# Patient Record
Sex: FEMALE | Race: WHITE | NOT HISPANIC OR LATINO | Employment: OTHER | URBAN - METROPOLITAN AREA
[De-identification: names, ages, dates, MRNs, and addresses within clinical notes are randomized per-mention and may not be internally consistent; named-entity substitution may affect disease eponyms.]

---

## 2017-01-06 ENCOUNTER — HOSPITAL ENCOUNTER (INPATIENT)
Facility: CLINIC | Age: 82
LOS: 18 days | Discharge: HOME | DRG: 884 | End: 2017-01-24
Attending: PHYSICIAN ASSISTANT | Admitting: PSYCHIATRY & NEUROLOGY
Payer: MEDICARE

## 2017-01-06 DIAGNOSIS — F32.A DEPRESSION, UNSPECIFIED DEPRESSION TYPE: ICD-10-CM

## 2017-01-06 DIAGNOSIS — F41.9 ANXIETY: ICD-10-CM

## 2017-01-06 DIAGNOSIS — F02.818 LATE ONSET ALZHEIMER'S DISEASE WITH BEHAVIORAL DISTURBANCE (H): Primary | ICD-10-CM

## 2017-01-06 DIAGNOSIS — F03.91 DEMENTIA WITH BEHAVIORAL DISTURBANCE, UNSPECIFIED DEMENTIA TYPE: ICD-10-CM

## 2017-01-06 DIAGNOSIS — G30.1 LATE ONSET ALZHEIMER'S DISEASE WITH BEHAVIORAL DISTURBANCE (H): Primary | ICD-10-CM

## 2017-01-06 LAB
ALBUMIN SERPL-MCNC: 4 G/DL (ref 3.4–5)
ALBUMIN UR-MCNC: NEGATIVE MG/DL
ALP SERPL-CCNC: 75 U/L (ref 40–150)
ALT SERPL W P-5'-P-CCNC: 33 U/L (ref 0–50)
AMPHETAMINES UR QL SCN: NORMAL
ANION GAP SERPL CALCULATED.3IONS-SCNC: 10 MMOL/L (ref 3–14)
APPEARANCE UR: CLEAR
AST SERPL W P-5'-P-CCNC: 44 U/L (ref 0–45)
BARBITURATES UR QL: NORMAL
BASOPHILS # BLD AUTO: 0 10E9/L (ref 0–0.2)
BASOPHILS NFR BLD AUTO: 0.3 %
BENZODIAZ UR QL: NORMAL
BILIRUB SERPL-MCNC: 0.7 MG/DL (ref 0.2–1.3)
BILIRUB UR QL STRIP: NEGATIVE
BUN SERPL-MCNC: 11 MG/DL (ref 7–30)
CALCIUM SERPL-MCNC: 9.4 MG/DL (ref 8.5–10.1)
CANNABINOIDS UR QL SCN: NORMAL
CHLORIDE SERPL-SCNC: 102 MMOL/L (ref 94–109)
CO2 SERPL-SCNC: 24 MMOL/L (ref 20–32)
COCAINE UR QL: NORMAL
COLOR UR AUTO: YELLOW
CREAT SERPL-MCNC: 0.63 MG/DL (ref 0.52–1.04)
DIFFERENTIAL METHOD BLD: NORMAL
EOSINOPHIL # BLD AUTO: 0.1 10E9/L (ref 0–0.7)
EOSINOPHIL NFR BLD AUTO: 0.9 %
ERYTHROCYTE [DISTWIDTH] IN BLOOD BY AUTOMATED COUNT: 13.6 % (ref 10–15)
GFR SERPL CREATININE-BSD FRML MDRD: ABNORMAL ML/MIN/1.7M2
GLUCOSE SERPL-MCNC: 102 MG/DL (ref 70–99)
GLUCOSE UR STRIP-MCNC: NEGATIVE MG/DL
HCT VFR BLD AUTO: 39.2 % (ref 35–47)
HGB BLD-MCNC: 13.5 G/DL (ref 11.7–15.7)
HGB UR QL STRIP: NEGATIVE
IMM GRANULOCYTES # BLD: 0 10E9/L (ref 0–0.4)
IMM GRANULOCYTES NFR BLD: 0.3 %
KETONES UR STRIP-MCNC: NEGATIVE MG/DL
LEUKOCYTE ESTERASE UR QL STRIP: ABNORMAL
LYMPHOCYTES # BLD AUTO: 1.9 10E9/L (ref 0.8–5.3)
LYMPHOCYTES NFR BLD AUTO: 28.7 %
MCH RBC QN AUTO: 32.1 PG (ref 26.5–33)
MCHC RBC AUTO-ENTMCNC: 34.4 G/DL (ref 31.5–36.5)
MCV RBC AUTO: 93 FL (ref 78–100)
MONOCYTES # BLD AUTO: 0.8 10E9/L (ref 0–1.3)
MONOCYTES NFR BLD AUTO: 12.5 %
NEUTROPHILS # BLD AUTO: 3.8 10E9/L (ref 1.6–8.3)
NEUTROPHILS NFR BLD AUTO: 57.3 %
NITRATE UR QL: NEGATIVE
NRBC # BLD AUTO: 0 10*3/UL
NRBC BLD AUTO-RTO: 0 /100
OPIATES UR QL SCN: NORMAL
PCP UR QL SCN: NORMAL
PH UR STRIP: 6.5 PH (ref 5–7)
PLATELET # BLD AUTO: 232 10E9/L (ref 150–450)
POTASSIUM SERPL-SCNC: 3.7 MMOL/L (ref 3.4–5.3)
PROT SERPL-MCNC: 7.4 G/DL (ref 6.8–8.8)
RBC # BLD AUTO: 4.21 10E12/L (ref 3.8–5.2)
RBC #/AREA URNS AUTO: ABNORMAL /HPF (ref 0–2)
SODIUM SERPL-SCNC: 136 MMOL/L (ref 133–144)
SP GR UR STRIP: 1.01 (ref 1–1.03)
TSH SERPL DL<=0.005 MIU/L-ACNC: 2.77 MU/L (ref 0.4–4)
URN SPEC COLLECT METH UR: ABNORMAL
UROBILINOGEN UR STRIP-ACNC: 0.2 EU/DL (ref 0.2–1)
WBC # BLD AUTO: 6.7 10E9/L (ref 4–11)
WBC #/AREA URNS AUTO: ABNORMAL /HPF (ref 0–2)

## 2017-01-06 PROCEDURE — 87086 URINE CULTURE/COLONY COUNT: CPT | Performed by: PHYSICIAN ASSISTANT

## 2017-01-06 PROCEDURE — 25000132 ZZH RX MED GY IP 250 OP 250 PS 637: Mod: GY | Performed by: PHYSICIAN ASSISTANT

## 2017-01-06 PROCEDURE — 84443 ASSAY THYROID STIM HORMONE: CPT | Performed by: PHYSICIAN ASSISTANT

## 2017-01-06 PROCEDURE — A9270 NON-COVERED ITEM OR SERVICE: HCPCS | Mod: GY | Performed by: PHYSICIAN ASSISTANT

## 2017-01-06 PROCEDURE — 80307 DRUG TEST PRSMV CHEM ANLYZR: CPT | Performed by: PHYSICIAN ASSISTANT

## 2017-01-06 PROCEDURE — 80053 COMPREHEN METABOLIC PANEL: CPT | Performed by: PHYSICIAN ASSISTANT

## 2017-01-06 PROCEDURE — 90791 PSYCH DIAGNOSTIC EVALUATION: CPT

## 2017-01-06 PROCEDURE — 85025 COMPLETE CBC W/AUTO DIFF WBC: CPT | Performed by: PHYSICIAN ASSISTANT

## 2017-01-06 PROCEDURE — 81001 URINALYSIS AUTO W/SCOPE: CPT | Performed by: PHYSICIAN ASSISTANT

## 2017-01-06 PROCEDURE — 99285 EMERGENCY DEPT VISIT HI MDM: CPT | Mod: 25

## 2017-01-06 PROCEDURE — 12400006 ZZH R&B MH INTERMEDIATE

## 2017-01-06 RX ORDER — QUETIAPINE FUMARATE 50 MG/1
50-150 TABLET, FILM COATED ORAL
Status: DISCONTINUED | OUTPATIENT
Start: 2017-01-06 | End: 2017-01-07

## 2017-01-06 RX ORDER — ACETAMINOPHEN 500 MG
500-1000 TABLET ORAL DAILY PRN
Status: DISCONTINUED | OUTPATIENT
Start: 2017-01-06 | End: 2017-01-24 | Stop reason: HOSPADM

## 2017-01-06 RX ORDER — LORAZEPAM 0.5 MG/1
0.5 TABLET ORAL ONCE
Status: COMPLETED | OUTPATIENT
Start: 2017-01-06 | End: 2017-01-06

## 2017-01-06 RX ORDER — ASPIRIN 81 MG/1
81 TABLET, CHEWABLE ORAL DAILY
Status: DISCONTINUED | OUTPATIENT
Start: 2017-01-07 | End: 2017-01-24 | Stop reason: HOSPADM

## 2017-01-06 RX ORDER — SIMVASTATIN 10 MG
40 TABLET ORAL AT BEDTIME
Status: DISCONTINUED | OUTPATIENT
Start: 2017-01-07 | End: 2017-01-11

## 2017-01-06 RX ORDER — LANOLIN ALCOHOL/MO/W.PET/CERES
1000 CREAM (GRAM) TOPICAL DAILY
Status: DISCONTINUED | OUTPATIENT
Start: 2017-01-07 | End: 2017-01-24 | Stop reason: HOSPADM

## 2017-01-06 RX ADMIN — LORAZEPAM 0.5 MG: 0.5 TABLET ORAL at 20:21

## 2017-01-06 ASSESSMENT — ENCOUNTER SYMPTOMS
SHORTNESS OF BREATH: 0
NERVOUS/ANXIOUS: 1
FEVER: 0
SLEEP DISTURBANCE: 1
CONFUSION: 1

## 2017-01-06 NOTE — IP AVS SNAPSHOT
[place] [address] [place] [address]    Phone:  [phone]                                       After Visit Summary   1/6/2017    Vanessa Watson    MRN: [id number]           After Visit Summary Signature Page     I have received my discharge instructions, and my questions have been answered. I have discussed any challenges I see with this plan with the nurse or doctor.    ..........................................................................................................................................  Patient/Patient Representative Signature      ..........................................................................................................................................  Patient Representative Print Name and Relationship to Patient    ..................................................               ................................................  Date                                            Time    ..........................................................................................................................................  Reviewed by Signature/Title    ...................................................              ..............................................  Date                                                            Time

## 2017-01-06 NOTE — IP AVS SNAPSHOT
MRN:[id number]                      After Visit Summary   1/6/2017    Vanessa Watson    MRN: [id number]           Thank you!     Thank you for choosing [facility] for your care. Our goal is always to provide you with excellent care.        Patient Information     Date Of Birth          6/14/1931        About your hospital stay     You were admitted on:  January 6, 2017 You last received care in the:  [facility]    You were discharged on:  January 24, 2017       Who to Call     For medical emergencies, please call 911.  For non-urgent questions about your medical care, please call your primary care provider or clinic, [phone]          Attending Provider     Provider    Katie Brand PA-C Winegarden, Thomas C, MD       Primary Care Provider Office Phone # Fax #    Joel Eaton [phone]       [place] [address]        Further instructions from your care team       Behavioral Discharge Planning and Instructions    Summary:  Admitted due to anxiety and poor sleep    Main Diagnosis:   Dementia with behavioral disturbance; Rule out Bipolar Disorder, type 2, most recent episode hypomanic.     Major Treatments, Procedures and Findings:  Psychiatric assessment    Symptoms to Report: Losing more sleep, Mood getting worse or Thoughts of suicide    Lifestyle Adjustment: Follow all treatment recommendations. Develop and follow safety plan. Due to your dementia diagnosis,  you should no longer drive a vehicle.  Your son will take possession of your car keys so that you can no longer drive.     Psychiatry Follow-up:     Your son will set up a follow up an appointment for you with a psychiatrist closer to the assisted living where you will be residing. In the meantime, until you have established care with a new psychiatrist, please contact your primary care physician for refills of your medications.  "    [facility]  Dr. Joel Eaton MD  [address] [address]  [phone] / [phone] fax    Your current [facility]  is Gisela Weiner at [facility]. She can be reached at [phone]. Her fax number is [phone].     Your son is making arrangements for you to move to [facility].     [facility]  [address] [place] [address] [phone] / [phone] fax    Resources:   Crisis Intervention: 866.719.1744 or 378-536-3181 (TTY: 972.948.2925).  Call anytime for help.  National [place] on [place] Illness (www.[place].chato.org): 757.854.1955 or 058-065-0644.  National Suicide Prevention Line (www.mentalhealthmn.org): 232-061-LEOJ (5590)  Mental Health Association of [place] (www.mentalhealth.org): 495.403.6306 or 460-924-9183    General Medication Instructions:   See your medication sheet(s) for instructions.   Take all medicines as directed.  Make no changes unless your doctor suggests them.   Go to all your doctor visits.  Be sure to have all your required lab tests. This way, your medicines can be refilled on time.  Do not use any drugs not prescribed by your doctor.  Avoid alcohol.        Pending Results     No orders found from 1/5/2017 to 1/7/2017.            Statement of Approval     Ordered          01/24/17 1021  I have reviewed and agree with all the recommendations and orders detailed in this document.   EFFECTIVE NOW     Approved and electronically signed by:  Stefan Cartagena MD             Admission Information        Provider Department Dept Phone    1/6/2017 Stefan Cartagena MD [facility] [phone]      Your Vitals Were     Blood Pressure Pulse Temperature    129/66 mmHg 75 97.5  F (36.4  C) (Oral)    Respirations Height Weight    16 1.549 m (5' 1\") 61.372 kg (135 lb 4.8 oz)    BMI (Body Mass Index) Pulse Oximetry       25.58 kg/m2 94%       MyChart Information     MyChart lets you " "send messages to your doctor, view your test results, renew your prescriptions, schedule appointments and more. To sign up, go to [email or web address] . Click on \"Log in\" on the left side of the screen, which will take you to the Welcome page. Then click on \"Sign up Now\" on the right side of the page.     You will be asked to enter the access code listed below, as well as some personal information. Please follow the directions to create your username and password.     Your access code is: [email or web address]  Expires: 2017 11:32 AM     Your access code will  in 90 days. If you need help or a new code, please call your [facility] clinic or [phone].        Care EveryWhere ID     This is your Care EveryWhere ID. This could be used by other organizations to access your [facility] medical records  [id number]           Review of your medicines      START taking        Dose / Directions    * OLANZapine 2.5 MG tablet   Commonly known as:  zyPREXA   Used for:  Dementia with behavioral disturbance, unspecified dementia type        Dose:  2.5 mg   Take 1 tablet (2.5 mg) by mouth every morning   Quantity:  30 tablet   Refills:  0       * OLANZapine 5 MG tablet   Commonly known as:  zyPREXA   Used for:  Late onset Alzheimer's disease with behavioral disturbance, Dementia with behavioral disturbance, unspecified dementia type        Dose:  5 mg   Take 1 tablet (5 mg) by mouth At Bedtime   Quantity:  30 tablet   Refills:  0       * Notice:  This list has 2 medication(s) that are the same as other medications prescribed for you. Read the directions carefully, and ask your doctor or other care provider to review them with you.      CONTINUE these medicines which have NOT CHANGED        Dose / Directions    acetaminophen 500 MG tablet   Commonly known as:  TYLENOL        Dose:  500-1000 mg   Take 500-1,000 mg by mouth daily as needed for mild pain   Refills:  0       Alendronate Sodium 70 MG Tbef        Dose:  70 mg   Take " 70 mg by mouth every 7 days   Refills:  0       ASPIRIN PO        Dose:  81 mg   Take 81 mg by mouth daily   Refills:  0       calcium-vitamin D 600-400 MG-UNIT per tablet   Commonly known as:  CALTRATE        Dose:  1 tablet   Take 1 tablet by mouth daily   Refills:  0       cholecalciferol 2000 UNITS Caps        Dose:  2000 Units   Take 2,000 Units by mouth daily   Refills:  0       cyanocobalamin 1000 MCG Tbcr   Commonly known as:  VITAMIN B-12 ER        Dose:  1000 mcg   Take 1,000 mcg by mouth daily   Refills:  0       SIMVASTATIN PO        Dose:  40 mg   Take 40 mg by mouth At Bedtime   Refills:  0         STOP taking     SEROQUEL PO                Where to get your medicines      These medications were sent to [facility] - [address]  [address], [place] [address]     Phone:  [phone]    - OLANZapine 2.5 MG tablet  - OLANZapine 5 MG tablet             Protect others around you: Learn how to safely use, store and throw away your medicines at www.disposemymeds.org.             Medication List: This is a list of all your medications and when to take them. Check marks below indicate your daily home schedule. Keep this list as a reference.      Medications           Morning Afternoon Evening Bedtime As Needed    acetaminophen 500 MG tablet   Commonly known as:  TYLENOL   Take 500-1,000 mg by mouth daily as needed for mild pain                                Alendronate Sodium 70 MG Tbef   Take 70 mg by mouth every 7 days                                ASPIRIN PO   Take 81 mg by mouth daily   Last time this was given:  81 mg on 1/24/2017  8:35 AM                                calcium-vitamin D 600-400 MG-UNIT per tablet   Commonly known as:  CALTRATE   Take 1 tablet by mouth daily   Last time this was given:  1 tablet on 1/24/2017  8:34 AM                                cholecalciferol 2000 UNITS Caps   Take 2,000 Units by mouth daily                                 cyanocobalamin 1000 MCG Tbcr   Commonly known as:  VITAMIN B-12 ER   Take 1,000 mcg by mouth daily                                * OLANZapine 2.5 MG tablet   Commonly known as:  zyPREXA   Take 1 tablet (2.5 mg) by mouth every morning   Last time this was given:  2.5 mg on 1/24/2017  8:34 AM                                * OLANZapine 5 MG tablet   Commonly known as:  zyPREXA   Take 1 tablet (5 mg) by mouth At Bedtime   Last time this was given:  2.5 mg on 1/24/2017  8:34 AM                                SIMVASTATIN PO   Take 40 mg by mouth At Bedtime   Last time this was given:  40 mg on 1/23/2017  9:59 PM                                * Notice:  This list has 2 medication(s) that are the same as other medications prescribed for you. Read the directions carefully, and ask your doctor or other care provider to review them with you.              More Information        Understanding Dementia  Dementia is the name for a group of brain conditions that make it harder to remember, reason, and communicate. The most common form of dementia is Alzheimer disease. Other types include vascular dementia, frontotemporal dementia, and Lewy body dementia. Years ago, dementia was often called  senility.  It was even thought to be a normal part of aging. We now know that it s not normal. It s caused by ongoing damage to cells in the brain.    Symptoms of dementia  Symptoms differ depending on which parts of the brain are affected and the stage of the disease. The most common symptoms include:    Memory loss, including trouble with directions and familiar tasks    Language problems, such as trouble getting words out or understanding what is said    Difficulty with planning, organizing, concentration, and judgment. This includes people not being able to recognize their own symptoms.    Changes in behavior and personality  How dementia affects the brain  The brain controls all the workings of the mind and body. Some parts of the  brain control memory and language. Other parts control movement and coordination. With dementia, nerve cells in the brain are gradually damaged or destroyed. Why this happens is not yet clear. But over time, parts of the brain begin to atrophy (shrink). Brain atrophy often starts in the part of the brain that controls memory, reasoning, and personality. Other parts of the brain may not be affected until much later in the illness.  The stages of dementia  Dementia is a progressive disease. This means it gets worse over time. Symptoms differ for each person, but there are 3 basic stages. Each may last from months to years:    In the early stage, a person may seem forgetful, confused, or have changes in behavior. However, he or she may still be able to handle most tasks without help.    In the middle stage, more and more help is needed with daily tasks. A person may have trouble recognizing friends and family members, wander, or get lost in familiar places. He or she may also become restless or tapia.    In the late stage, Alzheimer s can cause severe problems with memory, judgment, and other skills. Help is needed with nearly every aspect of daily life.  Treating dementia  At present, there s no cure for dementia. But with proper care, many people can live comfortably for years:     Medicines are a key part of treatment. Some types can help slow the progression of symptoms, such as memory loss. Others can help ease mood, behavior, and sleep problems. These medicines work for some people but not all.    Activity and exercise are good for body and mind. They may even help slow the progression of the disease. Simple, repetitive activities are good choices.    Regular healthcare provider visits help keep track of symptoms and overall health.    Sleep-wake cycle can be mixed up in patients with dementia. They may function better being up at nighttime and sleeping during the daytime.      Social interactions are important  to maintain.      9869-3901 [employer]. [address] [place] [address], [place] [address]. All rights reserved. This information is not intended as a substitute for professional medical care. Always follow your healthcare professional's instructions.

## 2017-01-06 NOTE — IP AVS SNAPSHOT
MRN:[id number]                      After Visit Summary   1/6/2017    Vanessa [place]    MRN: [id number]           Visit Information        Department      1/6/2017  6:49 PM [facility]          Review of your medicines      START taking        Dose / Directions    * OLANZapine 2.5 MG tablet   Commonly known as:  zyPREXA   Used for:  Dementia with behavioral disturbance, unspecified dementia type        Dose:  2.5 mg   Take 1 tablet (2.5 mg) by mouth every morning   Quantity:  30 tablet   Refills:  0       * OLANZapine 5 MG tablet   Commonly known as:  zyPREXA   Used for:  Late onset Alzheimer's disease with behavioral disturbance, Dementia with behavioral disturbance, unspecified dementia type        Dose:  5 mg   Take 1 tablet (5 mg) by mouth At Bedtime   Quantity:  30 tablet   Refills:  0       * Notice:  This list has 2 medication(s) that are the same as other medications prescribed for you. Read the directions carefully, and ask your doctor or other care provider to review them with you.      CONTINUE these medicines which have NOT CHANGED        Dose / Directions    acetaminophen 500 MG tablet   Commonly known as:  TYLENOL        Dose:  500-1000 mg   Take 500-1,000 mg by mouth daily as needed for mild pain   Refills:  0       Alendronate Sodium 70 MG Tbef        Dose:  70 mg   Take 70 mg by mouth every 7 days   Refills:  0       ASPIRIN PO        Dose:  81 mg   Take 81 mg by mouth daily   Refills:  0       calcium-vitamin D 600-400 MG-UNIT per tablet   Commonly known as:  CALTRATE        Dose:  1 tablet   Take 1 tablet by mouth daily   Refills:  0       cholecalciferol 2000 UNITS Caps        Dose:  2000 Units   Take 2,000 Units by mouth daily   Refills:  0       cyanocobalamin 1000 MCG Tbcr   Commonly known as:  VITAMIN B-12 ER        Dose:  1000 mcg   Take 1,000 mcg by mouth daily   Refills:  0       SIMVASTATIN PO        Dose:  40 mg   Take 40 mg by mouth At Bedtime    Refills:  0         STOP taking     SEROQUEL PO                Where to get your medicines      These medications were sent to [facility] [place], [place] - [address]  [address] [place] [address] [address]     Phone:  [phone]    - OLANZapine 2.5 MG tablet  - OLANZapine 5 MG tablet            Prescriptions were sent or printed at these locations (2 Prescriptions)                   [facility] - [address]   [address], [place] [address]    Telephone:  [phone]   Fax:  [phone]   Hours:                  E-Prescribed (2 of 2)         OLANZapine (ZYPREXA) 2.5 MG tablet               OLANZapine (ZYPREXA) 5 MG tablet                 Protect others around you: Learn how to safely use, store and throw away your medicines at www.disposemymeds.org.         Follow-ups after your visit         Care Instructions        Further instructions from your care team       Behavioral Discharge Planning and Instructions    Summary:  Admitted due to anxiety and poor sleep    Main Diagnosis:   Dementia with behavioral disturbance; Rule out Bipolar Disorder, type 2, most recent episode hypomanic.     Major Treatments, Procedures and Findings:  Psychiatric assessment    Symptoms to Report: Losing more sleep, Mood getting worse or Thoughts of suicide    Lifestyle Adjustment: Follow all treatment recommendations. Develop and follow safety plan. Due to your dementia diagnosis,  you should no longer drive a vehicle.  Your son will take possession of your car keys so that you can no longer drive.     Psychiatry Follow-up:     Your son will set up a follow up an appointment for you with a psychiatrist closer to the assisted living where you will be residing. In the meantime, until you have established care with a new psychiatrist, please contact your primary care physician for refills of your medications.     [facility]  Dr. Maldonado  "MD [place]  [address] [address]  [phone] / [phone] fax    Your current [facility]  is Gisela Weiner at [facility]. She can be reached at [phone]. Her fax number is [phone].     Your son is making arrangements for you to move to [facility].     [address] [address] [phone] / [phone] fax    Resources:   Crisis Intervention: 196.308.8775 or 376-139-4757 (TTY: 886.641.2016).  Call anytime for help.  National [place] on Mental Illness (www.mn.chato.org): 839.681.3747 or 438-437-4837.  National Suicide Prevention Line (www.mentalhealthmn.org): 113-038-LGUV (1826)  Mental Health Association of [place] (www.mentalhealth.org): 117.384.6823 or 354-364-6440    General Medication Instructions:   See your medication sheet(s) for instructions.   Take all medicines as directed.  Make no changes unless your doctor suggests them.   Go to all your doctor visits.  Be sure to have all your required lab tests. This way, your medicines can be refilled on time.  Do not use any drugs not prescribed by your doctor.  Avoid alcohol.        Statement of Approval     Ordered          01/24/17 1021  I have reviewed and agree with all the recommendations and orders detailed in this document.   EFFECTIVE NOW     Approved and electronically signed by:  Stefan Cartagena MD              Additional Information About Your Visit        [facility] Information     [facility] lets you send messages to your doctor, view your test results, renew your prescriptions, schedule appointments and more. To sign up, go to [email or web address] . Click on \"Log in\" on the left side of the screen, which will take you to the Welcome page. Then click on \"Sign up Now\" on the right side of the page.     You will be asked to enter the access code listed below, as well as some personal information. Please follow the directions to create your " "username and password.     Your access code is: [email or web address]  Expires: 2017 11:32 AM     Your access code will  in 90 days. If you need help or a new code, please call your [facility] clinic or [phone].        Care EveryWhere ID     This is your Care EveryWhere ID. This could be used by other organizations to access your [facility] medical records  [id number]        Your Vitals Were     Blood Pressure Pulse Temperature    129/66 mmHg 75 97.5  F (36.4  C) (Oral)    Respirations Height Weight    16 1.549 m (5' 1\") 61.372 kg (135 lb 4.8 oz)    BMI (Body Mass Index) Pulse Oximetry       25.58 kg/m2 94%        Primary Care Provider Office Phone # Fax #    Joel Eaton [phone]      Thank you!     Thank you for choosing [facility] for your care. Our goal is always to provide you with excellent care.             Medication List: This is a list of all your medications and when to take them. Check marks below indicate your daily home schedule. Keep this list as a reference.      Medications           Morning Afternoon Evening Bedtime As Needed    acetaminophen 500 MG tablet   Commonly known as:  TYLENOL   Take 500-1,000 mg by mouth daily as needed for mild pain                                Alendronate Sodium 70 MG Tbef   Take 70 mg by mouth every 7 days                                ASPIRIN PO   Take 81 mg by mouth daily   Last time this was given:  81 mg on 2017  8:35 AM                                calcium-vitamin D 600-400 MG-UNIT per tablet   Commonly known as:  CALTRATE   Take 1 tablet by mouth daily   Last time this was given:  1 tablet on 2017  8:34 AM                                cholecalciferol 2000 UNITS Caps   Take 2,000 Units by mouth daily                                cyanocobalamin 1000 MCG Tbcr   Commonly known as:  VITAMIN B-12 ER   Take 1,000 mcg by mouth daily                                * OLANZapine 2.5 MG tablet   Commonly known as:  zyPREXA   Take " 1 tablet (2.5 mg) by mouth every morning   Last time this was given:  2.5 mg on 1/24/2017  8:34 AM                                * OLANZapine 5 MG tablet   Commonly known as:  zyPREXA   Take 1 tablet (5 mg) by mouth At Bedtime   Last time this was given:  2.5 mg on 1/24/2017  8:34 AM                                SIMVASTATIN PO   Take 40 mg by mouth At Bedtime   Last time this was given:  40 mg on 1/23/2017  9:59 PM                                * Notice:  This list has 2 medication(s) that are the same as other medications prescribed for you. Read the directions carefully, and ask your doctor or other care provider to review them with you.

## 2017-01-07 LAB
BACTERIA SPEC CULT: NORMAL
Lab: NORMAL
MICRO REPORT STATUS: NORMAL
SPECIMEN SOURCE: NORMAL

## 2017-01-07 PROCEDURE — 25000132 ZZH RX MED GY IP 250 OP 250 PS 637: Mod: GY | Performed by: PSYCHIATRY & NEUROLOGY

## 2017-01-07 PROCEDURE — A9270 NON-COVERED ITEM OR SERVICE: HCPCS | Mod: GY | Performed by: PSYCHIATRY & NEUROLOGY

## 2017-01-07 PROCEDURE — 12400000 ZZH R&B MH

## 2017-01-07 PROCEDURE — 99221 1ST HOSP IP/OBS SF/LOW 40: CPT | Performed by: INTERNAL MEDICINE

## 2017-01-07 RX ORDER — OLANZAPINE 2.5 MG/1
2.5 TABLET, FILM COATED ORAL EVERY 6 HOURS PRN
Status: DISCONTINUED | OUTPATIENT
Start: 2017-01-07 | End: 2017-01-21

## 2017-01-07 RX ORDER — OLANZAPINE 2.5 MG/1
2.5 TABLET, FILM COATED ORAL AT BEDTIME
Status: DISCONTINUED | OUTPATIENT
Start: 2017-01-07 | End: 2017-01-09

## 2017-01-07 RX ADMIN — CYANOCOBALAMIN TAB 1000 MCG 1000 MCG: 1000 TAB at 07:49

## 2017-01-07 RX ADMIN — ASPIRIN 81 MG 81 MG: 81 TABLET ORAL at 07:49

## 2017-01-07 RX ADMIN — OLANZAPINE 2.5 MG: 2.5 TABLET, FILM COATED ORAL at 20:13

## 2017-01-07 RX ADMIN — Medication 1 TABLET: at 07:49

## 2017-01-07 RX ADMIN — OLANZAPINE 2.5 MG: 2.5 TABLET, FILM COATED ORAL at 21:50

## 2017-01-07 RX ADMIN — SIMVASTATIN 40 MG: 10 TABLET, FILM COATED ORAL at 20:14

## 2017-01-07 RX ADMIN — VITAMIN D, TAB 1000IU (100/BT) 2000 UNITS: 25 TAB at 07:49

## 2017-01-07 NOTE — ED NOTES
"[facility]  ED Nurse Handoff Report    ED Chief complaint: Anxiety      ED Diagnosis:   Final diagnoses:   Anxiety   Depression, unspecified depression type       Code Status: Full Code    Allergies: No Known Allergies    Activity level:  Independent     Needed?: No    Isolation: No  Infection: Not Applicable    Bariatric?: No      Vital Signs:   Filed Vitals:    01/06/17 1846   BP: 171/84   Pulse: 83   Temp: 98.3  F (36.8  C)   TempSrc: Oral   Resp: 14   Height: 1.575 m (5' 2\")   Weight: 60.782 kg (134 lb)   SpO2: 96%       Cardiac Rhythm: ,     n/a      Pain level:  zero    Is this patient confused?: No    Patient Report: Initial Complaint: anxiety  Focused Assessment: alert, anxious, cooperative, pt wanted to be admitted  Tests Performed: labs, urine  Abnormal Results: urine--wbc present, see reports  Treatments provided: ativan 0.5 mg given    Family Comments: not present    OBS brochure/video discussed/provided to patient: N/A    ED Medications:   Medications   LORazepam (ATIVAN) tablet 0.5 mg (0.5 mg Oral Given 1/6/17 2021)       Drips infusing?:  No      ED NURSE PHONE NUMBER: [phone]             "
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
## 2017-01-07 NOTE — H&P
"IDENTIFYING DATA:  Vanessa Watson is an 85-year-old   female admitted voluntarily due to complaints of anxiety and poor sleep.      CHIEF COMPLAINT:  \"I talk too much and I can't sleep.\"       HISTORY OF PRESENT ILLNESS:  Vanessa is an 85-year-old   female who resides locally.  She came into the emergency room expressing that she had been uncontrollably anxious, unable to function and unable to sleep.  She is very long winded and circumstantial.  Her speech is pressured and she is a bit difficult to redirect.  She indicates a number of stressors, says that she recently lost her job as a  and also lost her cat in her apartment building.  She has been taking some Seroquel at bedtime, previously was on Remeron and in the spring of 2016 was hospitalized at the geriatric unit at [facility].  She sees Dr. Solis who treats her for anxiety.  She showed me a list of medications that he has tried with her including Lamictal, Seroquel, Cymbalta and Remeron.  She tells me that he \"mentioned bipolar.\"  She has had sleep complaints and when he increased the Seroquel, she says it made her too sedated and she could not tolerate it.  She goes on and on in a very long winded fashion and needs a lot of redirection.  She is fully oriented and clinically has a very hypomanic flavor.      On further questioning,  the patient denies thoughts of wanting to hurt self or others, but told the ER that she could not go home, she could not function or tolerate her anxiety.  She does live independently.  They admitted her voluntarily, gave her a small dose of Ativan.  She does not have any chemical dependency history.  She is not endorsing any psychosis, panic disorder, obsessive-compulsive disorder, trauma history or symptoms of PTSD.  She seems to appreciate the fact that she cannot stop talking and states \"I hope you can help me with that.\"      PAST " PSYCHIATRIC HISTORY:  As above.  She was treated with Remeron and Seroquel over at [facility] and sees her outpatient psychiatrist.  It looks like they diagnosed her with depression and anxiety, contemplated some cognitive issues, but there is really nothing definitive there based on past testing.  She is fully oriented.      PAST CHEMICAL DEPENDENCY HISTORY:  Negative.      PAST MEDICAL HISTORY:  Unremarkable.      CURRENT MEDICATIONS:  Include vitamin D, aspirin and Seroquel 25-50 mg q.h.s. along with Zocor 40 mg at bedtime.      FAMILY HISTORY:  She says there is addiction in her family, including her daughter as well as depression.      SOCIAL HISTORY:  I believe the patient is .  Her  used to be a dentist and she mentions that he was very chemically dependent.  She goes on and on about her children, specifically talking about her daughter who has an addiction problem and is also a dentist.  She lives locally in an apartment, had been a  and prior to this was a .  She has [employer] degree.      REVIEW OF SYSTEMS:  A 10-point review of systems is conducted and the only positive symptom is poor sleep and anxiety on neurologic review of systems.  All other systems negative.        VITAL SIGNS:  Most recent vital signs:  Temperature 97.7, pulse 68, respiratory rate 14, blood pressure 126/75, oxygen saturation 94%.      MENTAL STATUS EXAMINATION:  Appearance:  The patient is a pleasant woman dressed in hospital garb.  She appears much much younger than her stated age of 85.  She is a fair historian.  Speech is pressured, use of language appropriate.  Motor exam, fidgety.  Affect is anxious.  Mood is anxious.  Thought process is very circumstantial with a mild to moderate flight of ideas.  She does respond to redirection.  Thought content negative for current hallucinations, delusions, paranoia, suicidal or  homicidal ideation.  She perseverates on her anxiety, poor sleep and difficulty slowing down her speech.  Insight and judgment are fair.  Cognitive exam, the patient is alert and oriented x3.  Concentration fair.  Recent memory fair.  Remote memory intact.  General fund of knowledge average.      IMPRESSION:  The patient is an 85-year-old woman presenting hyperverbal, anxious, sleep deprived.  Clinically, she looks hypomanic.  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  She has tried a few antidepressants and responds negatively to Seroquel.  Depakote might be worth considering, but I will offer her some Zyprexa to start with in a low dose.      DIAGNOSES:   1.  Anxiety disorder, not otherwise specified.   2.  Rule out bipolar disorder, type 2, most recent episode hypomanic.      PLAN:   1.  Initiate Zyprexa 2.5 mg q.h.s., 2.5 mg q.6 hours p.r.n.  We will hold off on Seroquel.   2.  Consider low-dose Depakote if she continues to be this intrusive and hyperverbal.   3.  Dr. Cartagena will assume care Monday.         ADEBAYO VALENTINE MD             D: 2017 09:22   T: 2017 10:43   MT: [place]      Name:     CATINA CROWELL   MRN:      [id number]        Account:      [id number]   :      1931           Admitted:     [id number]      Document: [id number]

## 2017-01-07 NOTE — PROGRESS NOTES
Milana pack  Purse  Make up items  $94.00 cash ($34 on unit, $60 in safe)  Loose change  Misc. Cards  Coat  Feminine products  4 books  Keys x7  Tote bags x2  Black pants  Tank top  Flannel pants  Bra  Underwear x2  Sweater   Long sleeves x2  Black slip on shoes  Socks  Gloves  Make up bags x2 with various make up items   Lotion  Headphone x2  Water bottle  MN DL    Security envelope NO: [id number]  $60.00 cash   SS card   [employer] gift card  Visa 1409  EBT card   Visa [phone]  Visa [phone]  Checkbook [id number]        Admission:    Name:_____________________________________Date:_______________________    Discharge:    Name:_____________________________________Date:_______________________

## 2017-01-07 NOTE — ED NOTES
Report called to station 77, nurse request that pharmacy finish her med list then I can send her up

## 2017-01-07 NOTE — ED NOTES
Pt states she is feeling much more relaxed and is sleepy, lights dimmed and pt is going to try to sleep.

## 2017-01-07 NOTE — H&P
Pt seen for initial psychiatric evaluation, please see my dictation for details and recommendations.    Alexys Rodriguez MD

## 2017-01-07 NOTE — ED PROVIDER NOTES
"  History     Chief Complaint:  Anxiety    HPI   Vanessa Watson is a 85 year old female with a history of anxiety and depression who presents to ED for evaluation of worsening anxiety. She states that earlier this week she had to take her cats to the vet and one of them \"escaped\" into the florez of her building. Since then she has had \"uncontrollable anxiety\", causing her not to be able to sleep or function on a daily basis. She states that she is not suicidal though does not feel safe at home. She states that she wants to be admitted until her regular psychiatrist returns from vacation. She lives alone, but has a son who checks on her from time to time. She denies any physical symptoms, including no recent fevers, chills, nausea, vomiting, abdominal pain or urinary symptoms. To note, she had her Seroquel dosage increased from 25 mg to 50 mg two days ago. She is also on Remeron.     Allergies:  No Known Drug Allergies    Medications:    mirtazapine (REMERON) 15 MG tablet   simvastatin (ZOCOR) 40 MG tablet   acetaminophen (TYLENOL) 500 MG tablet   calcium-vitamin D (CALTRATE) 600-400 MG-UNIT per tablet   cholecalciferol 2000 UNITS CAPS   cyanocobalamin (VITAMIN B-12 ER) 1000 MCG TBCR   Seroquel 50 mg daily   Alendronate sodium     Past Medical History:    Osteopenia  HDL  Anxiety  Spastic colon     Past Surgical History:    Hip surgery  Orthopedic surgery      Family History:    History reviewed. No pertinent family history.       Social History:  The patient presented to the ED alone.   Smoking Status: Never smoker  Smokeless Tobacco: No  Alcohol Use: Yes   Marital Status:  Single [1]     Review of Systems   Constitutional: Negative for fever.   Respiratory: Negative for shortness of breath.    Cardiovascular: Negative for chest pain.   Psychiatric/Behavioral: Positive for confusion and sleep disturbance. Negative for suicidal ideas. The patient is nervous/anxious.    All other systems reviewed and are " "negative.    Physical Exam     Patient Vitals for the past 24 hrs:   BP Temp Temp src Pulse Heart Rate Resp SpO2 Height Weight   01/06/17 2154 123/62 mmHg 97.7  F (36.5  C) Oral 62 - 12 94 % - -   01/06/17 2152 - - - - - - 95 % - -   01/06/17 1846 171/84 mmHg 98.3  F (36.8  C) Oral 83 83 14 96 % 1.575 m (5' 2\") 60.782 kg (134 lb)      Physical Exam  Nursing note and vitals reviewed.     GENERAL: Alert, mild distress, non toxic appearing.   HEENT: Normal conjunctiva. No scleral icterus. MMM.   NECK: Supple.  CARDIAC: Normal rate and regular rhythm. Normal heart sounds. No murmurs, rubs, or gallops appreciated.  PULMONARY: CTA bilaterally. Normal breath sounds. No wheezing, crackles, or rhonchi appreciated.  ABDOMEN: Soft, non distended abdomen. Non-tender. No rebound or guarding.   NEURO: Alert and oriented. Non-focal. GCS 15. Sensation intact throughout. Normal speech. CN II-XII intact. Normal strength. Able to ambulate without difficulty.   MUSCULOSKELETAL: Normal range of motion. No peripheral edema.   SKIN: Skin is warm and dry. No rashes. No pallor or jaundice.   PSYCH: Very anxious appearing. Poor eye contact. Hyperverbal, rapid speech. Does not express suicidal or homicidal ideation.    Emergency Department Course     Laboratory:  Laboratory findings were communicated with the patient who voiced understanding of the findings.  CBC: AWNL. (WBC 6.7, HGB 13.5, )   CMP: Glucose: 102 (H) o/w WNL (Creatinine 0.63)  TSH With Free T4: TSH: 2.77  UA: Leukocyte Esterase: Small (A), WBC/HPF: 2-5 (A) o/w normal  UC: In process   Drug Abuse: Negative    Interventions:  2021 Ativan 0.5 mg PO      Emergency Department Course:  Nursing notes and vitals reviewed.  I performed an exam of the patient as documented above.   IV was inserted and blood was drawn for laboratory testing, results above.  The patient provided a urine sample here in the emergency department. This was sent for laboratory testing, findings above. "   The patient spoke to DEC while in the ED.   Patient felt much improved following above interventions.   I discussed the treatment plan with the patient. They expressed understanding of this plan and consented to admission. I discussed the patient with Dr. Rodriguez, who will admit the patient to a monitored bed for further evaluation and treatment, though Dr. Cartagena will be accepting.     I personally reviewed the laboratory results with the Patient and answered all related questions prior to admission.    Impression & Plan      Medical Decision Making:  Vanessa Watson is a 85 year old female with a history of anxiety and depression, currently on Seroquel and Remeron who presents to the emergency department today with worsening anxiety over the past week. She states that she can not function at home, is not sleeping or eating, and is quite concerned as her psychiatrist is currently out of town and she does not feel safe at home though she denies any suicidal ideation. Here, on my exam she is quite anxious appearing, hyperverbal. Other differentials considered for her underlying anxiety, however lab work up is unremarkable including no evidence of thyroid dysfunction, acute metabolic derangement or anemia. Urine shows small leukocyte esterase but no significant bacteria or other gross signs of infection and she is not having any active urinary symptoms. Urine culture pending. Urine drug screen negative and there is no clinical history or signs on exam suggesting other acute ingestion. No history of head trauma and she has a non focal exam, making any acute intracranial process unlikely. Given this, no indication for head imaging.     She was admitted here on 3/4 secondary to depression/anxiety with concurrent delirium which was felt to be related to a urinary tract infection at that time. She was see by Dr. Cartagena and initiated on Seroquel and Remeron which she has been taking. Her psychiatrist recently  increased her Seroquel a few days ago. She was also admitted to [facility] on 4/19 for decompensating anxiety. Based on review of her chart and after meeting with patient, it sounds like there may be a component of very mild cognitive impairment, though patient has continued to be high functioning, living independently, etc. This may be exacerbating her anxiety/mental health issues. Despite this, I felt she required psychiatric admission for her decompensating anxiety. She was in agreement/voluntary at this point and no indication for 72 hour hold. Discussed the patient with Dr. Rodriguez of psychiatry who accepted for Dr. Cartagena. Patient will be admitted to step down bed. She felt much improved following a very small dose of oral Ativan 0.5 mg.     Diagnosis:    ICD-10-CM    1. Anxiety F41.9 Urine Culture   2. Depression, unspecified depression type F32.9       Disposition:   Admission    Scribe Disclosure:  I, Fredy Araujo, am serving as a scribe at 6:58 PM on 1/6/2017 to document services personally performed by Katie Brand PA-C, based on my observations and the provider's statements to me.   1/6/2017    EMERGENCY DEPARTMENT        Katie Brand PA-C  01/07/17 0235

## 2017-01-07 NOTE — PHARMACY-ADMISSION MEDICATION HISTORY
Admission medication history interview status for the 1/6/2017  admission is complete. See EPIC admission navigator for prior to admission medications     Medication history source reliability:Poor    Actions taken by pharmacist (provider contacted, etc): Patient unable to provide information regarding medications She identified she uses [facility].  .Contacted [facility] [phone] .and reviewed last hospital chart from [facility]    Additional medication history information not noted on PTA med list : Patient does not appear to be taking mirtazepine any longer,.  Most recent prescriptions from 1/2 is quetiapine 50 mg 1-3 tabs at night as needed    Medication reconciliation/reorder completed by provider prior to medication history? No    Time spent in this activity: 35 min    Prior to Admission medications    Medication Sig Last Dose Taking? Auth Provider   Alendronate Sodium 70 MG TBEF Take 70 mg by mouth every 7 days  Yes Unknown, Entered By History   SIMVASTATIN PO Take 40 mg by mouth At Bedtime  Filled 12/7  Yes Unknown, Entered By History   ASPIRIN PO Take 81 mg by mouth daily  Yes Unknown, Entered By History   QUEtiapine Fumarate (SEROQUEL PO) Take  mg by mouth nightly as needed   Started 1/2/2017  Yes Unknown, Entered By History   acetaminophen (TYLENOL) 500 MG tablet Take 500-1,000 mg by mouth daily as needed for mild pain prn Yes Unknown, Entered By History   calcium-vitamin D (CALTRATE) 600-400 MG-UNIT per tablet Take 1 tablet by mouth daily Past Week at Unknown time Yes Unknown, Entered By History   cholecalciferol 2000 UNITS CAPS Take 2,000 Units by mouth daily Past Week at Unknown time Yes Unknown, Entered By History   cyanocobalamin (VITAMIN B-12 ER) 1000 MCG TBCR Take 1,000 mcg by mouth daily Past Week at Unknown time Yes Unknown, Entered By History
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
## 2017-01-07 NOTE — ED NOTES
Pt states she feels very anxious and feels like she can not be alone at this time because of the anxiety.
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
## 2017-01-08 PROCEDURE — 25000132 ZZH RX MED GY IP 250 OP 250 PS 637: Mod: GY | Performed by: PSYCHIATRY & NEUROLOGY

## 2017-01-08 PROCEDURE — 12400000 ZZH R&B MH

## 2017-01-08 PROCEDURE — A9270 NON-COVERED ITEM OR SERVICE: HCPCS | Mod: GY | Performed by: PSYCHIATRY & NEUROLOGY

## 2017-01-08 RX ADMIN — CYANOCOBALAMIN TAB 1000 MCG 1000 MCG: 1000 TAB at 09:21

## 2017-01-08 RX ADMIN — ASPIRIN 81 MG 81 MG: 81 TABLET ORAL at 09:20

## 2017-01-08 RX ADMIN — Medication 1 TABLET: at 09:20

## 2017-01-08 RX ADMIN — OLANZAPINE 2.5 MG: 2.5 TABLET, FILM COATED ORAL at 21:09

## 2017-01-08 RX ADMIN — VITAMIN D, TAB 1000IU (100/BT) 2000 UNITS: 25 TAB at 09:20

## 2017-01-08 RX ADMIN — SIMVASTATIN 40 MG: 10 TABLET, FILM COATED ORAL at 21:09
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
## 2017-01-08 NOTE — H&P
[facility]    History and Physical  Hospitalist       Date of Admission:  1/6/2017  Date of Service (when I saw the patient): 01/07/2017    Assessment and Plans:    1. Dyslipidemia; no recent side effects and compliant with medication, per her report.  - Continue current simvastatin dose; follow up with PCP after discharge.    2. Osteopenia; overall bone health appears generally good.  No recent falls or joint symptoms.  - Continue alendronate dosing; follow up with PCP after discharge.  - Resume regular exercise program after discharge  - Continue Ca/Vitamin D supplements    3. Recent weight gain (5 # over ? Weeks); likely due to worsening of depressive/anxious symptoms, increased fat intake, less exercise.  - Resume exercise program as above  - Monitor weights weekly.    4. Osteoarthritis, per history.  Previous R. Knee symptoms have resolved; remote history of rotator cuff injury has not bothered her recently.  - Exercise as above; PRN tylenol if any joint symptoms develop while here.    5. Vitamin B12 deficiency; stable.  - Continue current supplements; follow up with PCP after discharge.      DVT Prophylaxis: Low Risk/Ambulatory with no VTE prophylaxis indicated  Code Status: Full Code    Disposition: Expected discharge: per assessments by Psychiatry team.     JANE Acharya MD, FACP       Primary Care Physician  Dr. Joel Eaton    Chief Complaint  Recent increase in symptoms of depression and anxiety; please see Psychiatry H & P by Dr. Alexys Rodriguez for details.    History is obtained from the patient, nursing staff and from the EHR    History of Present Illness  Vanessa Watson is a 85 year old female who presents with increased symptoms of anxiety and depression; the Hospitalist service has been asked to provide a medical evaluation related to her admission to the inpatient Psychiatry service.  She has no specific physical complaints at this time, but is concerned about a 5-pound weight  loss over the past weeks to months, while receiving Meals on Wheels.    Past Medical History   I have reviewed this patient's medical history and updated it with pertinent information from recent EHR documents.  Past Medical History   Diagnosis Date     Osteopenia      High cholesterol      Anxiety      Spastic colon        Past Surgical History  Details not available in recent notes; Vanessa is unable to provide.    Prior to Admission Medications  Prior to Admission Medications   Prescriptions Last Dose Informant Patient Reported? Taking?   ASPIRIN PO   Yes Yes   Sig: Take 81 mg by mouth daily   Alendronate Sodium 70 MG TBEF   Yes Yes   Sig: Take 70 mg by mouth every 7 days   QUEtiapine Fumarate (SEROQUEL PO)   Yes Yes   Sig: Take  mg by mouth nightly as needed   SIMVASTATIN PO   Yes Yes   Sig: Take 40 mg by mouth At Bedtime   acetaminophen (TYLENOL) 500 MG tablet prn  Yes Yes   Sig: Take 500-1,000 mg by mouth daily as needed for mild pain   calcium-vitamin D (CALTRATE) 600-400 MG-UNIT per tablet Past Week at Unknown time  Yes Yes   Sig: Take 1 tablet by mouth daily   cholecalciferol 2000 UNITS CAPS Past Week at Unknown time  Yes Yes   Sig: Take 2,000 Units by mouth daily   cyanocobalamin (VITAMIN B-12 ER) 1000 MCG TBCR Past Week at Unknown time  Yes Yes   Sig: Take 1,000 mcg by mouth daily      Facility-Administered Medications: None     Allergies  No Known Allergies    Social History  I have reviewed this patient's social history and updated it with pertinent information if needed. Vanessa Watson  reports that she has never smoked. She does not have any smokeless tobacco history on file. She reports that she drinks about 3.5 oz of alcohol per week. She reports that she does not use illicit drugs.    Family History  I have reviewed this patient's family history and updated it with pertinent information if needed.   No family history on file.    Review of Systems  The 10 point Review of Systems is negative other  than noted in the HPI or here.    Physical Exam  Temp: 98.2  F (36.8  C) Temp src: Oral BP: 132/70 mmHg Pulse: 80   Resp: 16        Vital Signs with Ranges  Temp:  [97.7  F (36.5  C)-98.2  F (36.8  C)] 98.2  F (36.8  C)  Pulse:  [68-80] 80  Resp:  [14-16] 16  BP: (126-132)/(70-75) 132/70 mmHg  135 lbs 4.8 oz    Constitutional: in no acute physical distress, sitting in chair.  Eyes: sclerae clear; PERRLA; EOMI  HEENT: atraumatic/normocephalic; nares and oropharynx moist  Neck: no lymphadenopathy; supple; no thyromegaly.  Respiratory: good a/e bilaterally; no wheezes or rhonchi  Cardiovascular: RRR; S1, S2 present; no murmurs, rubs or gallops  GI: abdomen mildly obese; + bowel sounds; non-tender, non-distended; no masses  Lymph/Hematologic: no obvious lymphadenopathy noted no exam  Genitourinary: deferred  Skin: no rash or lesions  Musculoskeletal: no edema or effusions of major joints  Neurologic: awake, conversant; follows directions well with redirection.  Psychiatric: tearful at first, but then smile intermittently as we talked; tangential but returns to question/topic w/ redirection.    Data  Data reviewed today:  I personally reviewed no images or EKG's today.    Recent Labs  Lab 01/06/17  1925   WBC 6.7   HGB 13.5   MCV 93         POTASSIUM 3.7   CHLORIDE 102   CO2 24   BUN 11   CR 0.63   ANIONGAP 10   ETHAN 9.4   *   ALBUMIN 4.0   PROTTOTAL 7.4   BILITOTAL 0.7   ALKPHOS 75   ALT 33   AST 44
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
## 2017-01-09 PROCEDURE — A9270 NON-COVERED ITEM OR SERVICE: HCPCS | Mod: GY | Performed by: PSYCHIATRY & NEUROLOGY

## 2017-01-09 PROCEDURE — 25000132 ZZH RX MED GY IP 250 OP 250 PS 637: Mod: GY | Performed by: PSYCHIATRY & NEUROLOGY

## 2017-01-09 PROCEDURE — 12400000 ZZH R&B MH

## 2017-01-09 PROCEDURE — 90853 GROUP PSYCHOTHERAPY: CPT

## 2017-01-09 RX ORDER — OLANZAPINE 5 MG/1
5 TABLET ORAL AT BEDTIME
Status: DISCONTINUED | OUTPATIENT
Start: 2017-01-09 | End: 2017-01-10

## 2017-01-09 RX ADMIN — CYANOCOBALAMIN TAB 1000 MCG 1000 MCG: 1000 TAB at 09:03

## 2017-01-09 RX ADMIN — VITAMIN D, TAB 1000IU (100/BT) 2000 UNITS: 25 TAB at 09:03

## 2017-01-09 RX ADMIN — SIMVASTATIN 40 MG: 10 TABLET, FILM COATED ORAL at 19:23

## 2017-01-09 RX ADMIN — ASPIRIN 81 MG 81 MG: 81 TABLET ORAL at 09:04

## 2017-01-09 RX ADMIN — Medication 1 TABLET: at 09:04

## 2017-01-09 RX ADMIN — OLANZAPINE 5 MG: 5 TABLET, FILM COATED ORAL at 19:24

## 2017-01-09 ASSESSMENT — ACTIVITIES OF DAILY LIVING (ADL)
GROOMING: INDEPENDENT;SHOWER
DRESS: STREET CLOTHES
GROOMING: INDEPENDENT
ORAL_HYGIENE: INDEPENDENT
ORAL_HYGIENE: INDEPENDENT
LAUNDRY: WITH SUPERVISION
DRESS: STREET CLOTHES;INDEPENDENT
LAUNDRY: WITH SUPERVISION

## 2017-01-09 NOTE — PROGRESS NOTES
"[facility] Psychiatric Progress Note       Interim History   The patient's care was discussed with the treatment team and chart notes were reviewed. Pt seen on SDU. Tolerating medications without side effects. Side effects, risks, and benefits of medications reviewed with patient. Pt is stabilizing on current medications.  Pt states she was able to sleep last night. She reports that she would like to take two tabs of 2.5mg of Zyprexa as it had allowed her to have better sleep. However, she remains highly anxious deciding this and would like not to feel \"overmedicated.\" Pt had been taking Seroquel at home. Pt has a nurse that comes out to her community of [place] people twice a day to assist her in taking her medications. Pt stated that she should contact Dr. 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 Pt would like to follow up with Dr. Ferrer as it is convenient for her to see him since she lives on [facility]. Also encouraged pt to obtain hearing aids as she is hard of hearing. Denies suicidal or homicidal ideation.      Medications     Current Facility-Administered Medications:    OLANZapine  2.5 mg Oral At Bedtime     aspirin chewable tablet 81 mg  81 mg Oral Daily     calcium-vitamin D  1 tablet Oral Daily     cholecalciferol  2,000 Units Oral Daily     cyanocobalamin  1,000 mcg Oral Daily     simvastatin (ZOCOR) tablet 40 mg  40 mg Oral At Bedtime     PRNs:  OLANZapine, acetaminophen      Allergies    No Known Allergies     Medical Review of Systems   /63 mmHg  Pulse 85  Temp(Src) 98.3  F (36.8  C) (Oral)  Resp 15  Ht 1.549 m (5' 1\")  Wt 61.372 kg (135 lb 4.8 oz)  BMI 25.58 kg/m2  SpO2 94%  Body mass index is 25.58 kg/(m^2).  A 10-point review of systems was performed by Dr. Cartagena and is negative, no new findings.      Psychiatric Examination     Appearance Sitting in chair, dressed in casual clothes. Appears stated " age.   Attitude Cooperative   Orientation Oriented to person, place, time   Eye Contact Fair   Speech Regular rate, rhythm, volume and tone   Language Normal   Psychomotor Behavior Normal   Mood Anxious   Affect Broad range   Thought Process Goal-Oriented, Intact   Associations Intact   Thought Content Patient is currently negative for suicide ideation, negative for plan or intent, able to contract no self harm and identify barriers to suicide.  Negative for obsessions, compulsions or psychosis.      Fund of Knowledge Average   Insight Improving   Judgement Impaired   Attention Span & Concentration Alert   Recent & Remote Memory Impaired   Gait Normal          Labs   Labs reviewed.  No results found for this or any previous visit (from the past 24 hour(s)).       Impression     The patient is an 85-year-old woman presenting hyperverbal, anxious, sleep deprived. She has been able to obtain sleep with Zyprexa and has been improving. She will likely follow up with Dr. Ferrer on discharge.     Diagnoses   1.  Anxiety disorder, not otherwise specified.    2.  Rule out bipolar disorder, type 2, most recent episode hypomanic.      Plan     1. Explained side effects, benefits, and complications of medications to the patient, Pt gave verbal consent.  2. Medication changes: Increase Zyprexa to 5mg qhs.  3. Discussed treatment plan with patient and team.  4. Projected length of stay: 2-3 days, until pt has been stabilized.    Attestation:   Patient has been seen and evaluated by me, Stefan Cartagena MD.    Patient ID:  Name: Vanessa Watson  MRN: [id number]  Admission: 1/6/2017   YOB: 1931

## 2017-01-09 NOTE — PLAN OF CARE
Problem: Discharge Planning  Goal: Discharge Planning (Adult, OB, Behavioral, Peds)  Received a voice mail from Ledy ALLEN RN for Dr. Angelito MD, patient's primary care doctor through [facility], [phone].  She wanted to provide us with the following information.  Patient goes by Cat.  She does have mild cognitive deficits and has been struggling to function for a while.  They have been recommending Assisted Living but patient has declined.  She stated patient's son actually set assisted living up and patient refused.  Patient has a waiver.  She has a worker once a month to help with finances.  They would like her to have more home care to help with medications but patient has refused to pay for this.  Ledy stated patient's family situation is complicated.  Patient sees Dr. Pooja MD, outpatient psychiatry.  He has ordered neuro psych testing and wants her son to go to an appointment with her.  Here are the resources Ledy mentioned:  Lorrie Laguerre, [facility] [phone], ILS worker through [facility], Dr. Pooja MD, psychiatry [phone],  Clementina Sahni, therapist, [facility] [phone] and Sarahi [facility]AVEZ [facility] [phone].

## 2017-01-09 NOTE — PLAN OF CARE
Problem: Depressive Symptoms  Goal: Depressive Symptoms  Signs and symptoms of listed problems will be absent or manageable.   Patient presents with a full range affect. She was visible and social. Patient attended all groups and particiapted with insight. She was less confused this shift and stated that she feels her memory has been better today. She would like to follow up with Dr. Ferrer outpatient. Patient has been very pleasant and hopeful this shift. She endorsed being able to sleep better last night and stated that her anxiety has been significantly improved as a result.

## 2017-01-09 NOTE — PLAN OF CARE
"Problem: Depressive Symptoms  Goal: Depressive Symptoms  Signs and symptoms of listed problems will be absent or manageable.   Outcome: No Change  Patient has been visible on the unit and social with peers. She presents as tense and anxious. Patient attended all evening groups and participated appropriately. Overall, patient is pleasant and cooperative. Patient is fixated on having \"a mood disorder.\" She says she had a chance to speak to her daughter on the phone this evening. Patient displays paranoid thinking about how much she talks. Patient was also focused on other patients having visits with family members. Her speech is circumstantial.         "

## 2017-01-09 NOTE — PROGRESS NOTES
"Writer called and spoke with karla. Of patient at patient's request. Daughter did not expect the call but appreciated it. Told daughter ( Vanessa Watson) about current meds. Daughter made a point of explaining that mother has been \" needy all her life \" and wonders if mother is compliant with taking meds on an outpatient basis or stops taking them and relapses with anxiety and mood disorder.   "

## 2017-01-10 PROCEDURE — 25000132 ZZH RX MED GY IP 250 OP 250 PS 637: Mod: GY | Performed by: PSYCHIATRY & NEUROLOGY

## 2017-01-10 PROCEDURE — A9270 NON-COVERED ITEM OR SERVICE: HCPCS | Mod: GY | Performed by: PSYCHIATRY & NEUROLOGY

## 2017-01-10 PROCEDURE — 90853 GROUP PSYCHOTHERAPY: CPT

## 2017-01-10 PROCEDURE — 12400000 ZZH R&B MH

## 2017-01-10 RX ORDER — OLANZAPINE 7.5 MG/1
7.5 TABLET, FILM COATED ORAL AT BEDTIME
Status: DISCONTINUED | OUTPATIENT
Start: 2017-01-10 | End: 2017-01-11

## 2017-01-10 RX ADMIN — VITAMIN D, TAB 1000IU (100/BT) 2000 UNITS: 25 TAB at 08:23

## 2017-01-10 RX ADMIN — CYANOCOBALAMIN TAB 1000 MCG 1000 MCG: 1000 TAB at 08:23

## 2017-01-10 RX ADMIN — SIMVASTATIN 40 MG: 10 TABLET, FILM COATED ORAL at 19:44

## 2017-01-10 RX ADMIN — OLANZAPINE 7.5 MG: 7.5 TABLET, FILM COATED ORAL at 19:44

## 2017-01-10 RX ADMIN — Medication 1 TABLET: at 08:23

## 2017-01-10 RX ADMIN — ASPIRIN 81 MG 81 MG: 81 TABLET ORAL at 08:23

## 2017-01-10 ASSESSMENT — ACTIVITIES OF DAILY LIVING (ADL)
LAUNDRY: WITH SUPERVISION
GROOMING: INDEPENDENT
ORAL_HYGIENE: INDEPENDENT
ORAL_HYGIENE: INDEPENDENT
GROOMING: INDEPENDENT
DRESS: STREET CLOTHES
LAUNDRY: WITH SUPERVISION
DRESS: STREET CLOTHES

## 2017-01-10 NOTE — PROGRESS NOTES
"[facility] Psychiatric Progress Note       Interim History   The patient's care was discussed with the treatment team and chart notes were reviewed. Pt seen on SDU. Tolerating medications without side effects. Side effects, risks, and benefits of medications reviewed with patient. Pt is stabilizing on current medications. Pt is deciding on whether she should stay for a few more days as she has not been sleeping as well as she would like. She would also like Dr. Cartagena to contact her daughter.  Pt stated she had lost her elastic bracelet for her [employer] service at home while [place] the [place]. Pt continues to endorse some mild paranoia, she recited a story of finding a stray [employer] and receipt in her pile of other things despite her meticulousness. Increase Zyprexa to 7.5mg qhs. Encouraged pt to remain another day to stabilize her condition as she continues to repeat questions frequently. Denies suicidal or homicidal ideation.      Medications     Current Facility-Administered Medications:    OLANZapine  5 mg Oral At Bedtime     aspirin chewable tablet 81 mg  81 mg Oral Daily     calcium-vitamin D  1 tablet Oral Daily     cholecalciferol  2,000 Units Oral Daily     cyanocobalamin  1,000 mcg Oral Daily     simvastatin (ZOCOR) tablet 40 mg  40 mg Oral At Bedtime     PRNs:  OLANZapine, acetaminophen      Allergies    No Known Allergies     Medical Review of Systems   /75 mmHg  Pulse 82  Temp(Src) 97.4  F (36.3  C) (Oral)  Resp 16  Ht 1.549 m (5' 1\")  Wt 61.372 kg (135 lb 4.8 oz)  BMI 25.58 kg/m2  SpO2 94%  Body mass index is 25.58 kg/(m^2).  A 10-point review of systems was performed by Dr. Cartagena and is negative, no new findings.      Psychiatric Examination     Appearance Sitting in chair, dressed in casual clothes. Appears stated age.   Attitude Cooperative   Orientation Oriented to person, place, time   Eye Contact Fair   Speech Regular rate, rhythm, volume and " tone   Language Normal   Psychomotor Behavior Normal   Mood Remains anxious   Affect Broad range   Thought Process Goal-Oriented, Intact   Associations Intact   Thought Content Patient is currently negative for suicide ideation, negative for plan or intent, able to contract no self harm and identify barriers to suicide.  Negative for obsessions, compulsions or psychosis.      Fund of Knowledge Average   Insight Very slightly improved   Judgement Impaired   Attention Span & Concentration Alert   Recent & Remote Memory Impaired   Gait Normal          Labs   Labs reviewed.  No results found for this or any previous visit (from the past 24 hour(s)).       Impression     The patient is an 85-year-old woman presenting hyperverbal, anxious, sleep deprived. She has been able to obtain sleep with Zyprexa and has been improving. She will likely follow up with Dr. Ferrer on discharge.     Diagnoses   1.  Anxiety disorder, not otherwise specified.    2.  Rule out bipolar disorder, type 2, most recent episode hypomanic.      Plan     1. Explained side effects, benefits, and complications of medications to the patient, Pt gave verbal consent.  2. Medication changes: Increase Zyprexa to 7.5mg qhs.  3. Discussed treatment plan with patient and team.  4. Projected length of stay: 2-3 days, until pt has been stabilized.    Attestation:   Patient has been seen and evaluated by me, Stefan Cartagena MD.    Patient ID:  Name: Vanessa Watson  MRN: [id number]  Admission: 1/6/2017   YOB: 1931

## 2017-01-10 NOTE — PLAN OF CARE
"Problem: Depressive Symptoms  Goal: Depressive Symptoms  Signs and symptoms of listed problems will be absent or manageable.   Outcome: Improving  Pt was elated to say that she is doing a lot better than when she was first admitted and feels that she came to the hospital due to a mental breakdown and not feeling safe at home.  Pt feels that she would now be able to feel safe being at home and that her anxiety symptoms have improved.  She told writer, however, that she is still unsure if she was having delusions or hallucinations while at home despite being medication compliant and would like to speak to Dr. Rosales about this further.   Pt was displaying odd behaviors while in her room and making odd comments about having to \"stage\" items to make sure no one was moving things about the room or taking things that did not belong to them.  She was expressing worry that her roommate might be using/taking some of her belongings, but was able to reason with writer other possible explanations for why an item could have been moved.  Staff found contraband items in pt's room to include a razor.  Overall, pt is pleasant and has been actively participating in groups and socializing with peers.            "

## 2017-01-10 NOTE — PLAN OF CARE
Problem: Depressive Symptoms  Goal: Depressive Symptoms  Signs and symptoms of listed problems will be absent or manageable.   Patient going to groups and states she gets a lot out of them. Pt states its hard to listen and write at the same time. Pt very attentive but is distractible. Pt was encouraged to just listen and seemed to comprehend the stories. Pt presents anxious and distracted when talked to but is able to follow the conversation. She states that she was a  for 30 years and recently retired this summer in June. Since then she feels isolated because she has lost all her friends from work and does not know what to do with herself. She also swims 3x weekly. Encouraged pt to seek out [facility] in [place] for socialization. Pt likes Zyprexa but does not want to increase to 7.5 mg. Note left for MD

## 2017-01-10 NOTE — PLAN OF CARE
Problem: Discharge Planning  Goal: Discharge Planning (Adult, OB, Behavioral, Peds)   received a call from DIOR Villafana with Dr. Joel Eaton at [facility] ([phone] / [phone] fax). Patient has not yet signed a release of information form so  only able to listen to information given by Dr. Eaton' nurse. She stated that she triaged patient and encouraged her to go to the emergency room as she was unable to care for herself and her pets. She stated that a [facility] care person had been out to see patient. She said that patient has an upcoming appointment for neuropsychological testing through Dr. Solis's office as well as a follow up appointment with Dr. Solis which her family will attend. Dr. Eaton' nurse stated that there are concerns over medication management as patient lives alone. Apparently her medications are not always set up correctly and she has a tendency to not manage her symptoms well with her medications. She said that patient is reluctant to set up additional services due to the out of pocket cost. She does have an ILS worker who comes to her home to help her with her finances. There is overall concern with patient's level of functioning currently. Apparently patient's son had set up assisted living placement for patient previously and patient had declined. Patient has been known to call both Dr. Eaton and Dr. Solis multiple times a day and to repeat the same questions over and over. She stated that patient exhibits a decreased level of insight.

## 2017-01-11 PROCEDURE — A9270 NON-COVERED ITEM OR SERVICE: HCPCS | Mod: GY | Performed by: PSYCHIATRY & NEUROLOGY

## 2017-01-11 PROCEDURE — 25000132 ZZH RX MED GY IP 250 OP 250 PS 637: Mod: GY | Performed by: PSYCHIATRY & NEUROLOGY

## 2017-01-11 PROCEDURE — 96125 COGNITIVE TEST BY HC PRO: CPT | Mod: GO

## 2017-01-11 PROCEDURE — 12400000 ZZH R&B MH

## 2017-01-11 PROCEDURE — 90853 GROUP PSYCHOTHERAPY: CPT

## 2017-01-11 RX ORDER — OLANZAPINE 5 MG/1
5 TABLET ORAL AT BEDTIME
Status: DISCONTINUED | OUTPATIENT
Start: 2017-01-11 | End: 2017-01-24 | Stop reason: HOSPADM

## 2017-01-11 RX ORDER — SIMVASTATIN 5 MG
5 TABLET ORAL AT BEDTIME
Status: DISCONTINUED | OUTPATIENT
Start: 2017-01-11 | End: 2017-01-11

## 2017-01-11 RX ORDER — SIMVASTATIN 20 MG
40 TABLET ORAL AT BEDTIME
Status: DISCONTINUED | OUTPATIENT
Start: 2017-01-11 | End: 2017-01-24 | Stop reason: HOSPADM

## 2017-01-11 RX ADMIN — Medication 1 TABLET: at 08:54

## 2017-01-11 RX ADMIN — CYANOCOBALAMIN TAB 1000 MCG 1000 MCG: 1000 TAB at 08:55

## 2017-01-11 RX ADMIN — OLANZAPINE 5 MG: 5 TABLET, FILM COATED ORAL at 20:02

## 2017-01-11 RX ADMIN — VITAMIN D, TAB 1000IU (100/BT) 2000 UNITS: 25 TAB at 08:54

## 2017-01-11 RX ADMIN — SIMVASTATIN 40 MG: 20 TABLET, FILM COATED ORAL at 20:02

## 2017-01-11 RX ADMIN — ASPIRIN 81 MG 81 MG: 81 TABLET ORAL at 08:54

## 2017-01-11 ASSESSMENT — ACTIVITIES OF DAILY LIVING (ADL)
DRESS: STREET CLOTHES
ORAL_HYGIENE: INDEPENDENT
GROOMING: INDEPENDENT
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
## 2017-01-11 NOTE — PLAN OF CARE
Problem: Depressive Symptoms  Goal: Depressive Symptoms  Signs and symptoms of listed problems will be absent or manageable.   Outcome: No Change  Having patient monitored at night for wandering.  She was extremely fearful this am and would not get up. . She was wearing her roomates nameband and thought that someone was trying to put her to  sleep because she had her blood draw tape on.  Roommate said she keeps taking her toothbrush and things.In a private room now.  OT evaluation ordered. Family meeting to be planned with pt's children on Friday at 0900.

## 2017-01-11 NOTE — PROGRESS NOTES
"[facility] Psychiatric Progress Note       Interim History   The patient's care was discussed with the treatment team and chart notes were reviewed. Pt seen on SDU. Tolerating medications without side effects. Side effects, risks, and benefits of medications reviewed with patient. Pt is stabilizing on current medications. Staff report that pt had been wandering last night. She had been wearing a roommate's wristband and believed that she was being harmed from the Band-aid that she had on her arm. Pt does not recall this. She states that she had quit her job as she was too overwhelmed with technology. Pt's PCP, Joel Eaton, left a message with his nurse on the station that she had not been able to stay compliant with her medications despite having someone to help manage her medications. Pt would like to remain in [place] if she believes that her family and her doctor would like her to be placed in assisted living. She had turned down an offer in [place] as none of her family had visited her. OT assessment ordered. Decrease Zyprexa to 5mg qhs. She is concerned that her children will not spend the money to place her in an assisted living facility. Pt has been having difficulty recalling facts and needs to be told several times of the treatment plan. Denies suicidal or homicidal ideation.      Medications     Current Facility-Administered Medications:    OLANZapine  7.5 mg Oral At Bedtime     aspirin chewable tablet 81 mg  81 mg Oral Daily     calcium-vitamin D  1 tablet Oral Daily     cholecalciferol  2,000 Units Oral Daily     cyanocobalamin  1,000 mcg Oral Daily     simvastatin (ZOCOR) tablet 40 mg  40 mg Oral At Bedtime     PRNs:  OLANZapine, acetaminophen      Allergies    No Known Allergies     Medical Review of Systems   /87 mmHg  Pulse 82  Temp(Src) 98  F (36.7  C) (Oral)  Resp 16  Ht 1.549 m (5' 1\")  Wt 61.372 kg (135 lb 4.8 oz)  BMI 25.58 kg/m2  SpO2 94%  Body mass index is " 25.58 kg/(m^2).  A 10-point review of systems was performed by Dr. Cartagena and is negative, no new findings.      Psychiatric Examination     Appearance Sitting in chair, dressed in casual clothes. Appears stated age.   Attitude Cooperative, Paranoid   Orientation Oriented to person, place, time   Eye Contact Fair   Speech Regular rate, rhythm, volume and tone   Language Normal   Psychomotor Behavior Normal   Mood Remains anxious   Affect Flat   Thought Process Intact, Linear   Associations Intact   Thought Content Patient is currently negative for suicide ideation, negative for plan or intent, able to contract no self harm and identify barriers to suicide.  Negative for obsessions, compulsions or psychosis.      Fund of Knowledge Average   Insight Very slightly improved   Judgement Impaired   Attention Span & Concentration Alert   Recent & Remote Memory Poor   Gait Normal          Labs   Labs reviewed.  No results found for this or any previous visit (from the past 24 hour(s)).       Impression     The patient is an 85-year-old woman presenting hyperverbal, anxious, sleep deprived. She has been able to obtain sleep with Zyprexa and has been improving. She will likely follow up with Dr. Ferrer on discharge.     Diagnoses   1. Dementia with behavioral disturbance.  2.  Rule out bipolar disorder, type 2, most recent episode hypomanic.      Plan     1. Explained side effects, benefits, and complications of medications to the patient, Pt gave verbal consent.  2. Medication changes: Decrease Zyprexa to 5mg qhs.  3. Discussed treatment plan with patient and team.  4. Projected length of stay: 2-3 days, until pt has been stabilized.  5. Family meeting planned for Friday at 0900.    Attestation:   Patient has been seen and evaluated by me, Stefan Cartagena MD.    Patient ID:  Name: Vanessa Watson   MRN: [id number]  Admission: 1/6/2017   YOB: 1931

## 2017-01-11 NOTE — PROGRESS NOTES
"[facility] Psychiatric Progress Note       Interim History   The patient's care was discussed with the treatment team and chart notes were reviewed. Pt seen on SDU. Tolerating medications without side effects. Side effects, risks, and benefits of medications reviewed with patient. Pt is stabilizing on current medications. Staff report that pt had been wandering last night. She had been wearing a roommate's wristband and believed that she was being harmed from the Band-aid that she had on her arm. Pt does not recall this. She states that she had quit her job as she was too overwhelmed with technology. Pt's PCP, Joel Eaton, left a message with his nurse on the station that she had not been able to stay compliant with her medications despite having someone to help manage her medications. Pt would like to remain in [place] if she believes that her family and her doctor would like her to be placed in assisted living. She had turned down an offer in [place] as none of her family had visited her. OT assessment ordered. Decrease Zyprexa to 5mg qhs. She is concerned that her children will not spend the money to place her in an assisted living facility. Pt has been having difficulty recalling facts and needs to be told several times of the treatment plan. Denies suicidal or homicidal ideation. Family meeting to be planned with pt's children on Friday at 0900.     Medications     Current Facility-Administered Medications:    simvastatin (ZOCOR) tablet 40 mg  40 mg Oral At Bedtime     OLANZapine  5 mg Oral At Bedtime     aspirin chewable tablet 81 mg  81 mg Oral Daily     calcium-vitamin D  1 tablet Oral Daily     cholecalciferol  2,000 Units Oral Daily     cyanocobalamin  1,000 mcg Oral Daily     PRNs:  OLANZapine, acetaminophen      Allergies    No Known Allergies     Medical Review of Systems   /79 mmHg  Pulse 79  Temp(Src) 97.6  F (36.4  C) (Oral)  Resp 16  Ht 1.549 m (5' 1\")  Wt 61.372 kg " (135 lb 4.8 oz)  BMI 25.58 kg/m2  SpO2 94%  Body mass index is 25.58 kg/(m^2).  A 10-point review of systems was performed by Dr. Cartagena and is negative, no new findings.      Psychiatric Examination     Appearance Sitting in chair, dressed in casual clothes. Appears stated age.   Attitude Cooperative, Paranoid   Orientation Oriented to person, place, time   Eye Contact Fair   Speech Regular rate, rhythm, volume and tone   Language Normal   Psychomotor Behavior Normal   Mood Remains anxious   Affect Flat   Thought Process Intact, Linear   Associations Intact   Thought Content Patient is currently negative for suicide ideation, negative for plan or intent, able to contract no self harm and identify barriers to suicide.  Negative for obsessions, compulsions or psychosis.      Fund of Knowledge Average   Insight Very slightly improved   Judgement Impaired   Attention Span & Concentration Alert   Recent & Remote Memory Poor   Gait Normal          Labs   Labs reviewed.  No results found for this or any previous visit (from the past 24 hour(s)).       Impression     The patient is an 85-year-old woman presenting hyperverbal, anxious, sleep deprived. She has been able to obtain sleep with Zyprexa and has been improving. She will likely follow up with Dr. Ferrer on discharge.     Diagnoses   1. Dementia with behavioral disturbance.  2.  Rule out bipolar disorder, type 2, most recent episode hypomanic.      Plan     1. Explained side effects, benefits, and complications of medications to the patient, Pt gave verbal consent.  2. Medication changes: Decrease Zyprexa to 5mg qhs.  3. Discussed treatment plan with patient and team.  4. Projected length of stay: 2-3 days, until pt has been stabilized.  5. Family meeting planned for Friday at 0900.    Attestation:   Patient has been seen and evaluated by me, Stefan Cartagena MD.    Patient ID:  Name: Vanessa Watson   MRN: [id number]  Admission: 1/6/2017   Date of Birth:  6/14/1931

## 2017-01-11 NOTE — PLAN OF CARE
Problem: Discharge Planning  Goal: Discharge Planning (Adult, OB, Behavioral, Peds)  Patient attended process group on 1/9, 1/10 and 1/11. Participation was complete. Did need reiteration of directions and cues as to what issues/questions to address.

## 2017-01-11 NOTE — PLAN OF CARE
Problem: Discharge Planning  Goal: Discharge Planning (Adult, OB, Behavioral, Peds)   called and left a message with patient's son, Berhane Watson ([phone]), notifying him that patient's physician would like to have a family meeting on Friday 01/13/16 at 0900.  awaiting callback.     Patient's son called back and stated that he will not be able to attend a family meeting on Friday. He suggested that  his sister, Vanessa Watson at [phone], about attending the meeting.  called and left a message with patient's daughter.  awaiting callback.

## 2017-01-11 NOTE — PLAN OF CARE
Problem: Depressive Symptoms  Goal: Depressive Symptoms  Signs and symptoms of listed problems will be absent or manageable.     Per nursing staff: Staff will check patient at night for wandering.  She was extremely fearful this am and would not get up. Said she was woozy. She was wearing her roomates nameband and thought that someone was trying to put her to  sleep because she had her blood draw tape on.  Roommate said she keeps taking her toothbrush and things.  OT evaluation ordered. Pt was moved to a private room.   **Patient would like someone to fill out her menu each day with healthy options including lots of fruit, as she finds the menu confusing    Though Pt was tearful and very frightened this morning, she did know that she was at this hospital. Pt attended part of focus group. She spent the rest of the shift in her room, the florez or conference room. She was less anxious as the shift progressed. Her short term memory was very impaired this morning.

## 2017-01-11 NOTE — PLAN OF CARE
"Problem: General Rehab Plan of Care  Goal: Occupational Therapy Goals  The patient and/or their representative will achieve their patient-specific goals related to the plan of care.  The patient-specific goals include:  INITIAL O.T. ASSESSMENT   Details:  Pt arrived at OT Exercise group just as the group was ending. She was apologetic but then asked this writer to go over the stretches we had covered in their entirety. Pt did not consider the impact her request might have on this writer and demonstrated a high degree of self focus, trademark of a cognitive impairment. This writer agreed to show her stretches for about 5 minutes. Pt was talkative but not to the point of pressured. [facility] and [facility] \"[facility] saved her from a knee replacement.\" Thinking was concrete. Attention was poor; pt was distractible.         "

## 2017-01-11 NOTE — PLAN OF CARE
Problem: Depressive Symptoms  Goal: Depressive Symptoms  Signs and symptoms of listed problems will be absent or manageable.   Patient very social tonite and states she feels great. Very pleasant

## 2017-01-12 PROCEDURE — A9270 NON-COVERED ITEM OR SERVICE: HCPCS | Mod: GY | Performed by: PSYCHIATRY & NEUROLOGY

## 2017-01-12 PROCEDURE — 25000132 ZZH RX MED GY IP 250 OP 250 PS 637: Mod: GY | Performed by: PSYCHIATRY & NEUROLOGY

## 2017-01-12 PROCEDURE — 90853 GROUP PSYCHOTHERAPY: CPT

## 2017-01-12 PROCEDURE — 12400000 ZZH R&B MH

## 2017-01-12 RX ADMIN — OLANZAPINE 2.5 MG: 2.5 TABLET, FILM COATED ORAL at 05:30

## 2017-01-12 RX ADMIN — VITAMIN D, TAB 1000IU (100/BT) 2000 UNITS: 25 TAB at 08:05

## 2017-01-12 RX ADMIN — CYANOCOBALAMIN TAB 1000 MCG 1000 MCG: 1000 TAB at 08:05

## 2017-01-12 RX ADMIN — OLANZAPINE 5 MG: 5 TABLET, FILM COATED ORAL at 20:58

## 2017-01-12 RX ADMIN — SIMVASTATIN 40 MG: 20 TABLET, FILM COATED ORAL at 20:59

## 2017-01-12 RX ADMIN — Medication 1 TABLET: at 08:05

## 2017-01-12 RX ADMIN — ASPIRIN 81 MG 81 MG: 81 TABLET ORAL at 08:05

## 2017-01-12 NOTE — PLAN OF CARE
Problem: Depressive Symptoms  Goal: Depressive Symptoms  Signs and symptoms of listed problems will be absent or manageable.   Outcome: No Change  Pt has been present and pleasant. Appears anxious and rambles on about certain things she is concerned about. State of mind is foggy and doesn't remember things well; needed help with filling out her menu and seems to be confused about certain topics and statements. Pt is respectful and polite to peers and staff. Bright upon approach.

## 2017-01-12 NOTE — PROGRESS NOTES
"[facility] Psychiatric Progress Note       Interim History   The patient's care was discussed with the treatment team and chart notes were reviewed. Pt seen on SDU. Tolerating medications without side effects. Side effects, risks, and benefits of medications reviewed with patient. Pt is stabilizing on current medications. Pt was looking forward to having a family meeting today, she had apparently forgotten that the meeting was planned for Friday at 0900. She was quite stressed out last night as she had been moved to a private room and became scared and tearful. OT evaluation completed yesterday, recommendation is placement in assisted living and possible driving assessment due to her decline in cognitive function. She is in agreement with this plan. She has been upset with her son as he had not spoken with her psychiatrist and he did not place her in an assisted living that she enjoyed, \"they isolated me up in the bed, and there was a very scary wide staircase that I could've fallen down.\" She claims there was a \"party going on last night, they kept me awake right when I wanted my rest so badly.\" She was given Zyprexa 2.5mg at 0530 to allow her sleep. Denies suicidal or homicidal ideation.      Medications     Current Facility-Administered Medications:    simvastatin (ZOCOR) tablet 40 mg  40 mg Oral At Bedtime     OLANZapine  5 mg Oral At Bedtime     aspirin chewable tablet 81 mg  81 mg Oral Daily     calcium-vitamin D  1 tablet Oral Daily     cholecalciferol  2,000 Units Oral Daily     cyanocobalamin  1,000 mcg Oral Daily     PRNs:  OLANZapine, acetaminophen      Allergies    No Known Allergies     Medical Review of Systems   /60 mmHg  Pulse 98  Temp(Src) 97.7  F (36.5  C) (Oral)  Resp 16  Ht 1.549 m (5' 1\")  Wt 61.372 kg (135 lb 4.8 oz)  BMI 25.58 kg/m2  SpO2 94%  Body mass index is 25.58 kg/(m^2).  A 10-point review of systems was performed by Dr. Cartagena and is negative, no new " findings.      Psychiatric Examination     Appearance Sitting in chair, dressed in casual clothes. Appears stated age.   Attitude Cooperative, Remains Paranoid   Orientation Oriented to person, place, time   Eye Contact Fair   Speech Regular rate, rhythm, volume and tone   Language Normal   Psychomotor Behavior Normal   Mood Remains anxious   Affect Flat   Thought Process Intact, Linear   Associations Intact   Thought Content Patient is currently negative for suicide ideation, negative for plan or intent, able to contract no self harm and identify barriers to suicide.  Negative for obsessions, compulsions or psychosis.      Fund of Knowledge Average   Insight Very slightly improved   Judgement Slightly improved   Attention Span & Concentration Impaired   Recent & Remote Memory Poor   Gait Normal          Labs   Labs reviewed.  No results found for this or any previous visit (from the past 24 hour(s)).       Impression     The patient is an 85-year-old woman presenting hyperverbal, anxious, sleep deprived. She has been able to obtain sleep with Zyprexa and has been improving. She will likely follow up with Dr. Ferrer on discharge.     Diagnoses   1. Dementia with behavioral disturbance.  2.  Rule out bipolar disorder, type 2, most recent episode hypomanic.      Plan     1. Explained side effects, benefits, and complications of medications to the patient, Pt gave verbal consent.  2. Medication changes: Continue Zyprexa to 5mg qhs.  3. Discussed treatment plan with patient and team.  4. Projected length of stay: 2-3 days, until pt has been stabilized.  5. Family meeting planned for Friday at 0900.    Attestation:   Patient has been seen and evaluated by me, Stefan Cartagena MD.    Patient ID:  Name: Vanessa Watson   MRN: [id number]  Admission: 1/6/2017   YOB: 1931

## 2017-01-12 NOTE — PLAN OF CARE
"Problem: General Rehab Plan of Care  Goal: Occupational Therapy Goals  The patient and/or their representative will achieve their patient-specific goals related to the plan of care.  The patient-specific goals include:  Assess cognition as needed.   OCCUPATIONAL THERAPY:  Independent Living Evaluation / CPT   Patient Name: Vanessa Watson  Date of Assessment:   1/11/17                         Time of day: AM ____  Afternoon: _14:00___  The results of this assessment provide recommendations based on the functioning at the time of  assessment. This may not reflect baseline functioning.   Home Responsibilities/Safety Assessment:    Meal prep: Pt reports independence, tried meals on wheels but did not like it.  Cleaning:  Pt reports independence.   Laundry: Pt reports independence.   Shopping: Pt reports independence.   Finances: Pt receives assistance from \"a girl a few hours a month. She comes 2 times per month. It's not enough.\"  Driving: Pt reports independence.   Medication Management: Meds set up by home nurse every other week.  Work: Pt is retired.  Care of a family member: N/A    Cognitive Performance Test: The CPT is a standardized, performance-based assessment used to assess cognitive-functional information processing and executive processing.  This test is based on performance of some common activities. The level of performance may help to describe how information is being processed, potential safety risks and recommendations for supervision needs in the individual s living environment.  CPT Results:  MEDBOX: 5.0/6 Pt attended to all 4 bottles and attempted to follow all directions. She was able to correct three errors with specific cues and additional time  SHOP/GLOVES: 3.5/6 Pt selected and paid with specific verbal cues. She needed additional cues to select different gloves, locate all of the money, and to pay the EXACT amount. She paid the incorrect dollar amount.  PHONE: 4.0/6 Pt needed specific instructions " "to locate the number. She asked a question related to paint and only relayed a portion of the information.    WASH:  4.5/5 Pt completed concrete task with 1 additional verbal cue and support.                          TOTAL CPT Average Score: 4.25/5.75  OBSERVATIONS/ASSESSMENT  Pt presented as anxious. Speech was tangential, circumstantial, and perseverative. Responses to history questions were vague and often included irrelevant information. She perceived her memory/thinking as \"very clear most of the time.\" When asked about her living situation, pt stated she lives in an apartment alone but \"A miracle is happening on my housing.\" She stated regarding her housing, \"I have made a decision,\" but declined to elaborate. Pt articulated feeling as though she needed more assistance at home with with her finances and cleaning.     During the assessment, pt struggled with a number of aspects of cognition. Short term memory and working deficits were apparent during all tasks. She demonstrated difficulty with problem solving. Thinking was markedly concrete. During two tasks, pt told this writer what she \"would\" do rather than initiating the task. She applied all of the tasks to what she would do in her normal life, demonstrating mental inflexibility.     Pt was seen at [facility] in April of 2016. At that time she scored a 4.7 on her CPT and 29/30 on the MoCa. Her score today represents a significant decline in function.     RECOMMENDATIONS (based upon assessment/group observation)  Recommend Assisted Living with care including frequent checks, possibly all meals provided / no cooking, medication administered, increased assistance with financial management, assistance in attending activities until routine is followed, driving assessment.        RANGE OF THE COGNITIVE PERFORMANCE TEST (CPT)   Note these are ranges and there may be fluctuations in abilities for an individual at different times of days                     "                                                                                       ___Level 6.0 - 5.2  Normal range  Indicates normal functioning (absence of cognitive disability).  1. Independent in managing personal affairs, seeking help or advice as needed.  2. Uses complex information to carry out daily activities with safety and accuracy.  3. Written information, numbers, symbols and hypothetical thoughts are well understood.  4. Problems are anticipated, errors are avoided, and consequences of actions are considered.  ____Level 5.2   Independent in managing personal affairs.  1. Planning, problem solving and learning may be based on visual-motor activities or external cues.  2. Potential secondary effects (for example, medication side effects) may not be understood.  ___Level 5.0   Basic cognitive functioning is intact, but higher-level functions are impaired and deficits may not be readily apparent.  May have trouble with complex information or activities; and problems are observed with memory, judgment, reasoning and planning ahead.  1. Safety:  May require assistance to plan ahead; or to manage complex medication schedules, appointments or finances.  2. IADL:  Mild functional decline.  Generally able to complete basic self-cares and routine household tasks.  May begin to have difficulty with complex daily tasks such as reading, writing, meal preparation, shopping or driving.  3. New learning may consist of trial and error.  4. Mild problems with conversation and are often able to conceal deficits.  5. May have strong feelings of personal rights or exhibit self-centered behavior related to trouble seeing the  whole picture , and can appear disorganized or uninhibited.  6. Driving ability may be affected. Recommend driving assessment.  _X__Level 4.5  Memory, judgment, reasoning and planning show obvious impairment, slowed processing.  Complex daily tasks are performed with obvious difficulty or error  without visual cues. Some concrete thinking.  Difficulty dividing attention.  1. Safety:    Medications typically should be set up, stove use may require supervision, and driving ability is affected, Driving assessment recommended.  2. IADL:  Mild difficulty with simple everyday self-care tasks, but may have significant deficits in reading, writing, shopping, and managing finances. May need reminders.  3. Do well with a regular routine, but will likely need reminders to complete anything outside of that routine.  4. May have self centered behavior or inability to consider the needs of others. May see an inability to predict problems that may occur.  _X__Level 4.0  Indicates concrete thinking and deficits with abstract concepts like planning and problem solving.  Behavior is goal-directed, unable to follow multi-step directions, is easily distracted, and may not recognize mistakes.  Inability to anticipate hazards or understand precautions. Slowed thinking process.  1. Safety:  Recommend 24-hour supervision for safety.  Medication management should be done by others and monitor for hazardous activities (use of sharp objects, heat or household chemicals).  May get lost in unfamiliar surroundings.  Inability to follow restricted diet.  Generally, persons functioning at a low level 4 should retire from driving.  2. IADL:  Some decline in quality or frequency of ADL as self awareness deficit begins. Two [place] enhanced by use of a routine, simple concrete directions, and caregiver set-up of needed items.  More complex tasks such as money or home management typically require assistance and may need to be done by others.  3. May rely heavily on vision to guide behavior and may ignore objects/hazards that are not in plain sight.  4. Often have poor insight into their level of disability.  Able to carry out social conversation and may verbally  cover  for deficits leading caregivers to believe they are capable of  functioning independently.  ___Level 3.5  Aware of concrete task steps but need prompting or cues to complete simple tasks.  Task objects may need to be handed to the person for task initiation.  Attention span is limited, simple directions may need to be repeated, and re-focus to a topic or task may be required. Others must complete complex tasks.    1. Safety:  24-hour supervision required for safety and for assistance with daily tasks.  Assistance required with medications as the type of medication, dosages, and timeframes will not be understood.  Meals, nutrition and dietary restrictions need to be monitored.  Prone to wandering and can become lost.  Not able to comprehend the dangers surrounding heat, chemicals and sharp objects.  Should not drive. May be at risk for falls.  2. IADL:  Moderate - severe functional decline.  Requires set-up by a caregiver.  Able to complete most concrete daily cares with cueing.  Function best with a set schedule in familiar surroundings with familiar people.  3. Speech may be less spontaneous and verbal expression is often incomplete.  May become agitated with over-stimulation, under stimulation or lack of routine..                                  CPT designed by Kia STEVEN at the [facility], [place], [place]

## 2017-01-12 NOTE — PLAN OF CARE
Problem: Discharge Planning  Goal: Discharge Planning (Adult, OB, Behavioral, Peds)   spoke with patient's son, Berhane Watson ([phone]), and he confirmed that he can be available by phone on Monday at 0900 for a family meeting.  left a message with patient's daughter, Vanessa Watson ([phone]), to notify her of the family meeting on Monday at 0900.  awaiting callback.      received a message from patient's county , [place] ([phone]), requesting an update on patient.  unable to reach her so left a message for callback.  did notify county  of the family meeting planned for Monday.

## 2017-01-13 PROCEDURE — A9270 NON-COVERED ITEM OR SERVICE: HCPCS | Mod: GY | Performed by: PSYCHIATRY & NEUROLOGY

## 2017-01-13 PROCEDURE — 25000132 ZZH RX MED GY IP 250 OP 250 PS 637: Mod: GY | Performed by: PSYCHIATRY & NEUROLOGY

## 2017-01-13 PROCEDURE — 90853 GROUP PSYCHOTHERAPY: CPT

## 2017-01-13 PROCEDURE — 12400000 ZZH R&B MH

## 2017-01-13 RX ADMIN — ASPIRIN 81 MG 81 MG: 81 TABLET ORAL at 08:19

## 2017-01-13 RX ADMIN — Medication 1 TABLET: at 08:19

## 2017-01-13 RX ADMIN — OLANZAPINE 2.5 MG: 2.5 TABLET, FILM COATED ORAL at 08:52

## 2017-01-13 RX ADMIN — CYANOCOBALAMIN TAB 1000 MCG 1000 MCG: 1000 TAB at 08:19

## 2017-01-13 RX ADMIN — VITAMIN D, TAB 1000IU (100/BT) 2000 UNITS: 25 TAB at 08:19

## 2017-01-13 RX ADMIN — SIMVASTATIN 40 MG: 20 TABLET, FILM COATED ORAL at 20:26

## 2017-01-13 RX ADMIN — OLANZAPINE 5 MG: 5 TABLET, FILM COATED ORAL at 20:26

## 2017-01-13 ASSESSMENT — ACTIVITIES OF DAILY LIVING (ADL)
ORAL_HYGIENE: INDEPENDENT
DRESS: STREET CLOTHES;INDEPENDENT
GROOMING: INDEPENDENT

## 2017-01-13 NOTE — PROGRESS NOTES
"[facility] Psychiatric Progress Note       Interim History   The patient's care was discussed with the treatment team and chart notes were reviewed. Pt seen on SDU. Tolerating medications without side effects. Side effects, risks, and benefits of medications reviewed with patient. Pt is stabilizing on current medications. Pt endorses that she has been hearing \"buzzing\" in her ears. Otherwise she states she is \"fine\" today. Informed pt that a family meeting will occur on Monday at 0900. Encouraged pt to contact her  for the meeting on Monday. She took Zyprexa 2.5mg this morning. Pt stated she was able to sleep well last night. She is looking forward to having a meeting with her children. Denies suicidal or homicidal ideation.      Medications     Current Facility-Administered Medications:    simvastatin (ZOCOR) tablet 40 mg  40 mg Oral At Bedtime     OLANZapine  5 mg Oral At Bedtime     aspirin chewable tablet 81 mg  81 mg Oral Daily     calcium-vitamin D  1 tablet Oral Daily     cholecalciferol  2,000 Units Oral Daily     cyanocobalamin  1,000 mcg Oral Daily     PRNs:  OLANZapine, acetaminophen      Allergies    No Known Allergies     Medical Review of Systems   /76 mmHg  Pulse 82  Temp(Src) 97.2  F (36.2  C) (Oral)  Resp 16  Ht 1.549 m (5' 1\")  Wt 61.372 kg (135 lb 4.8 oz)  BMI 25.58 kg/m2  SpO2 94%  Body mass index is 25.58 kg/(m^2).  A 10-point review of systems was performed by Dr. Cartagena and is negative, no new findings.      Psychiatric Examination     Appearance Sitting in chair, dressed in casual clothes. Appears stated age.   Attitude Cooperative, Remains Paranoid   Orientation Oriented to person, place, time   Eye Contact Fair   Speech Regular rate, rhythm, volume and tone   Language Normal   Psychomotor Behavior Normal   Mood Less anxious   Affect Less flat   Thought Process Intact, Linear   Associations Intact   Thought Content Patient is currently negative " for suicide ideation, negative for plan or intent, able to contract no self harm and identify barriers to suicide.  Negative for obsessions, compulsions or psychosis.      Fund of Knowledge Average   Insight Slightly improved   Judgement Slightly improved   Attention Span & Concentration Impaired   Recent & Remote Memory Poor   Gait Normal          Labs   Labs reviewed.  No results found for this or any previous visit (from the past 24 hour(s)).       Impression     The patient is an 85-year-old woman presenting hyperverbal, anxious, sleep deprived. She has been able to obtain sleep with Zyprexa and has been improving. She will likely follow up with Dr. Ferrer on discharge.     Diagnoses   1. Dementia with behavioral disturbance.  2.  Rule out bipolar disorder, type 2, most recent episode hypomanic.      Plan     1. Explained side effects, benefits, and complications of medications to the patient, Pt gave verbal consent.  2. Medication changes: Continue Zyprexa 5mg qhs.  3. Discussed treatment plan with patient and team.  4. Projected length of stay: 2-3 days, until pt has been stabilized.  5. Family meeting planned for Monday at 0900.    Attestation:   Patient has been seen and evaluated by me, Stefan Cartagena MD.    Patient ID:  Name: Vanessa Watson   MRN: [id number]  Admission: 1/6/2017   YOB: 1931

## 2017-01-13 NOTE — PLAN OF CARE
Problem: Depressive Symptoms  Goal: Depressive Symptoms  Signs and symptoms of listed problems will be absent or manageable.   Outcome: Improving  Patient is still very anxious but stated that it is better with the medication.  She did take a prn zyprexa today(2.5mg).  She went to the morning groups.  She only wrote things down and did not talk.  During the video she did cry in reaction to what was on the video.  She did not go to the afternoon group.  She is very concerned about the meeting on Monday and wanted her son to come in for it rather than be on the phone so he could actually see how she is doing.  Explained to her to write down what she wanted to talk about in the meeting.  She wants to go home.

## 2017-01-13 NOTE — PLAN OF CARE
"Problem: Discharge Planning  Goal: Discharge Planning (Adult, OB, Behavioral, Peds)   received a callback from patient's county , [place]. She stated that she will be at the discharge planning meeting scheduled for Monday 01/16/17 at 0900. She said that she has been working with patient since June 2016. She stated that patient had apparent memory problems at that time which seemed to fluctuate. She describes the family as not being very supportive of patient. She stated that in the six months she has worked with patient, she has only spoken with patient's son, Berhane Watson. She stated that the daughter has never returned any phone calls. She said that at times it seems that patient's son seems to \"bully\" patient into doing things that she may not want to do, She said that last summer she had gone through the process of trying to get patient into an assisted living facility, but then patient had refused to move. She stated that she would be willing to help facilitate getting patient into an assisted living facility now if that is the identified plan after the discharge planning meeting.      called and left another message with patient's daughter, Vanessa Watson ([phone]) to see if she can come to the discharge planning meeting on Monday.  has attempted to reach her several times, but patient's daughter has not yet responded.                                "

## 2017-01-13 NOTE — PLAN OF CARE
"Problem: Discharge Planning  Goal: Discharge Planning (Adult, OB, Behavioral, Peds)  Patient attended Process Group on Thursday 01/12/16 and participated appropriately. Patient struggled with understanding the topic of discussion. Her answers to the discussion questions were quite vague and demonstrated limited insight. In response to something she could work on towards her wellness in the next 24 hours, she stated \"I am working so hard on it that you would not believe it.\"         "

## 2017-01-13 NOTE — PLAN OF CARE
"Problem: Depressive Symptoms  Goal: Depressive Symptoms  Signs and symptoms of listed problems will be absent or manageable.   Outcome: No Change  Pt is pleasant and cooperative. Appears to be anxious, and confused at times. Pt is tangential in conversation, and rambles. Pt was isolative to her room this shift. During 1:1 pt stated she was having a \"roller coaster of a day, with ups and downs\", but did not elaborate. Pt was observed sobbing in her room at night, staff attempted to console her. Pt stated that a phone call with her son upset her, because he was \"being abusive, and mean\", and also stated that her son and daughter keep breaking her heart. Pt stated \"I don't want to go into detail.\"         "

## 2017-01-14 PROCEDURE — A9270 NON-COVERED ITEM OR SERVICE: HCPCS | Mod: GY | Performed by: PSYCHIATRY & NEUROLOGY

## 2017-01-14 PROCEDURE — 25000132 ZZH RX MED GY IP 250 OP 250 PS 637: Mod: GY | Performed by: PSYCHIATRY & NEUROLOGY

## 2017-01-14 PROCEDURE — 12400000 ZZH R&B MH

## 2017-01-14 PROCEDURE — 90853 GROUP PSYCHOTHERAPY: CPT

## 2017-01-14 RX ADMIN — VITAMIN D, TAB 1000IU (100/BT) 2000 UNITS: 25 TAB at 10:00

## 2017-01-14 RX ADMIN — Medication 1 TABLET: at 10:00

## 2017-01-14 RX ADMIN — OLANZAPINE 5 MG: 5 TABLET, FILM COATED ORAL at 21:10

## 2017-01-14 RX ADMIN — SIMVASTATIN 40 MG: 20 TABLET, FILM COATED ORAL at 21:10

## 2017-01-14 RX ADMIN — OLANZAPINE 2.5 MG: 2.5 TABLET, FILM COATED ORAL at 10:04

## 2017-01-14 RX ADMIN — ASPIRIN 81 MG 81 MG: 81 TABLET ORAL at 10:00

## 2017-01-14 RX ADMIN — CYANOCOBALAMIN TAB 1000 MCG 1000 MCG: 1000 TAB at 09:59

## 2017-01-14 NOTE — PROGRESS NOTES
"0500- Pt heard whispering in room. Expressed concerns to this writer. Appeared anxious and tense. Restless, switching from laying position to sitting position while conversing. Pt would like to discuss her concerns with hearing difficulties, and discharge options. She states she does not want to go to another area like \"[place]\" or \"[place],\" b/c she doesn't know the area. She would like to stay on \"[place]\" b/c she has the [employer], [employer], and [facility]. She says her daughter has set her up with an [employer] account if she cannot drive places. Based on previous notes, not sure if she has talked to her daughter about this. She is also very concerned with the potential of full hospitalization, and would like to know \"exactly\" who is attending her meeting Monday. Pt did mention she slept \"really well\" last night. After talking w/ pt, appeared calm, relaxed, and laid in bed.     "

## 2017-01-14 NOTE — PLAN OF CARE
Problem: Depressive Symptoms  Goal: Depressive Symptoms  Signs and symptoms of listed problems will be absent or manageable.   Outcome: No Change  Pt has been visible on SDU. Affect is flat/sad but brightens upon approach. Pt attended groups and ate. Pt is waiting for family meeting this coming Monday. Pt has kept herself busy making phone calls and bed resting. Pt did her laundry today and showered.

## 2017-01-14 NOTE — PROGRESS NOTES
"[facility] Psychiatric Progress Note       Interim History   The patient's care was discussed with the treatment team and chart notes were reviewed. Pt seen on SDU. Tolerating medications without side effects. Side effects, risks, and benefits of medications reviewed with patient.. Pr continues to perseverate about her family needing to help her. Phone family meeting scheduled for next Monday. Pt's [facility] was contacted and she is willing to help pt get placement in Assisted living.      Medications     Current Facility-Administered Medications:    simvastatin (ZOCOR) tablet 40 mg  40 mg Oral At Bedtime     OLANZapine  5 mg Oral At Bedtime     aspirin chewable tablet 81 mg  81 mg Oral Daily     calcium-vitamin D  1 tablet Oral Daily     cholecalciferol  2,000 Units Oral Daily     cyanocobalamin  1,000 mcg Oral Daily     PRNs:  OLANZapine, acetaminophen      Allergies    No Known Allergies     Medical Review of Systems   /72 mmHg  Pulse 98  Temp(Src) 97.2  F (36.2  C) (Oral)  Resp 16  Ht 1.549 m (5' 1\")  Wt 61.372 kg (135 lb 4.8 oz)  BMI 25.58 kg/m2  SpO2 94%  Body mass index is 25.58 kg/(m^2).  A 10-point review of systems was performed by Dr. Cartagena and is negative, no new findings.      Psychiatric Examination     Appearance Sitting in chair, dressed in casual clothes. Appears stated age.   Attitude Cooperative, Remains Paranoid   Orientation Oriented to person, place, time   Eye Contact Fair   Speech Regular rate, rhythm, volume and tone   Language Normal   Psychomotor Behavior Normal   Mood Less anxious   Affect Less flat   Thought Process Intact, Linear   Associations Intact   Thought Content Patient is currently negative for suicide ideation, negative for plan or intent, able to contract no self harm and identify barriers to suicide.  Negative for obsessions, compulsions or psychosis.      Fund of Knowledge Average   Insight Slightly improved   Judgement Slightly improved "   Attention Span & Concentration Impaired   Recent & Remote Memory Poor   Gait Normal          Labs   Labs reviewed.  No results found for this or any previous visit (from the past 24 hour(s)).       Impression     The patient is an 85-year-old woman presenting hyperverbal, anxious, sleep deprived. She has been able to obtain sleep with Zyprexa and has been improving. She will likely follow up with Dr. Ferrer on discharge.     Diagnoses   1. Dementia with behavioral disturbance.  2.  Rule out bipolar disorder, type 2, most recent episode hypomanic.      Plan     1. Explained side effects, benefits, and complications of medications to the patient, Pt gave verbal consent.  2. Medication changes: Continue Zyprexa 5mg qhs.  3. Discussed treatment plan with patient and team.  4. Projected length of stay: 2-3 days, until pt has been stabilized.  5. Family meeting planned for Monday at 0900.    Attestation:   Patient has been seen and evaluated by me, Stefan Cartagena MD.    Patient ID:  Name: Vanessa Watson   MRN: [id number]  Admission: 1/6/2017   YOB: 1931

## 2017-01-15 PROCEDURE — 12400000 ZZH R&B MH

## 2017-01-15 PROCEDURE — A9270 NON-COVERED ITEM OR SERVICE: HCPCS | Mod: GY | Performed by: PSYCHIATRY & NEUROLOGY

## 2017-01-15 PROCEDURE — 25000132 ZZH RX MED GY IP 250 OP 250 PS 637: Mod: GY | Performed by: PSYCHIATRY & NEUROLOGY

## 2017-01-15 RX ADMIN — Medication 1 TABLET: at 08:44

## 2017-01-15 RX ADMIN — OLANZAPINE 5 MG: 5 TABLET, FILM COATED ORAL at 19:49

## 2017-01-15 RX ADMIN — CYANOCOBALAMIN TAB 1000 MCG 1000 MCG: 1000 TAB at 08:44

## 2017-01-15 RX ADMIN — OLANZAPINE 2.5 MG: 2.5 TABLET, FILM COATED ORAL at 00:17

## 2017-01-15 RX ADMIN — ASPIRIN 81 MG 81 MG: 81 TABLET ORAL at 08:44

## 2017-01-15 RX ADMIN — VITAMIN D, TAB 1000IU (100/BT) 2000 UNITS: 25 TAB at 08:44

## 2017-01-15 RX ADMIN — SIMVASTATIN 40 MG: 20 TABLET, FILM COATED ORAL at 19:49

## 2017-01-15 NOTE — PROGRESS NOTES
"[facility] Psychiatric Progress Note       Interim History   The patient's care was discussed with the treatment team and chart notes were reviewed. Pt seen on SDU. Tolerating medications without side effects. Side effects, risks, and benefits of medications reviewed with patient. Care Conference tomorrow. Pt bemoans that her family doesn't care. Discussed need for Assisted living pt continued to perseverate about her family not careing.  No side effects with meds.      Medications     Current Facility-Administered Medications:    simvastatin (ZOCOR) tablet 40 mg  40 mg Oral At Bedtime     OLANZapine  5 mg Oral At Bedtime     aspirin chewable tablet 81 mg  81 mg Oral Daily     calcium-vitamin D  1 tablet Oral Daily     cholecalciferol  2,000 Units Oral Daily     cyanocobalamin  1,000 mcg Oral Daily     PRNs:  OLANZapine, acetaminophen      Allergies    No Known Allergies     Medical Review of Systems   /81 mmHg  Pulse 80  Temp(Src) 97.9  F (36.6  C) (Oral)  Resp 16  Ht 1.549 m (5' 1\")  Wt 61.372 kg (135 lb 4.8 oz)  BMI 25.58 kg/m2  SpO2 94%  Body mass index is 25.58 kg/(m^2).  A 10-point review of systems was performed by Dr. Cartagena and is negative, no new findings.      Psychiatric Examination     Appearance Sitting in chair, dressed in casual clothes. Appears stated age.   Attitude Cooperative, Remains Paranoid   Orientation Oriented to person, place, time   Eye Contact Fair   Speech Regular rate, rhythm, volume and tone   Language Normal   Psychomotor Behavior Normal   Mood Less anxious   Affect Less flat   Thought Process Intact, Linear   Associations Intact   Thought Content Patient is currently negative for suicide ideation, negative for plan or intent, able to contract no self harm and identify barriers to suicide.  Negative for obsessions, compulsions or psychosis.      Fund of Knowledge Average   Insight Slightly improved   Judgement Slightly improved   Attention Span & " Concentration Impaired   Recent & Remote Memory Poor   Gait Normal          Labs   Labs reviewed.  No results found for this or any previous visit (from the past 24 hour(s)).       Impression     The patient is an 85-year-old woman presenting hyperverbal, anxious, sleep deprived. She has been able to obtain sleep with Zyprexa and has been improving. She will likely follow up with Dr. Ferrer on discharge.     Diagnoses   1. Dementia with behavioral disturbance.  2.  Rule out bipolar disorder, type 2, most recent episode hypomanic.      Plan     1. Explained side effects, benefits, and complications of medications to the patient, Pt gave verbal consent.  2. Medication changes: Continue Zyprexa 5mg qhs.  3. Discussed treatment plan with patient and team.  4. Projected length of stay: 7days, until pt has been stabilized.  5. Family meeting planned for Monday at 0900.    Attestation:   Patient has been seen and evaluated by me, Stefan Cartagena MD.    Patient ID:  Name: Vanessa Watson   MRN: [id number]  Admission: 1/6/2017   YOB: 1931

## 2017-01-15 NOTE — PLAN OF CARE
Problem: Depressive Symptoms  Goal: Depressive Symptoms  Signs and symptoms of listed problems will be absent or manageable.   Outcome: No Change  Patient spent most of shift in room. Said she was enjoying sitting in the sun and thinking. Feeling anxious about her family meeting tomorrow and is very sad neither of her children will be attending. They live in [place] and [place], so she doesn't understand why they don't come to visit her more often. This upsets her greatly. Kids don't talk to each other and her   somewhat recently from alcoholism, so the holidays are hard for her. She just wants her kids to hug her but they says they are too busy. Thinks her children might be abusing alcohol since her 's death. Her son wants to move her out by him, but she doesn't want to. She likes her life as it is and the people she has around her. Says she won't have the support if she's forced to move. She loves her apartment, her cats, her [employer] and her gym. 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 Says she's a people-person and needs people around her. She wants to continue to grow with the people that are currently around her. Attended only spirituality group. Med-compliant.

## 2017-01-15 NOTE — PROGRESS NOTES
"SPIRITUAL HEALTH SERVICES  Spiritual Assessment Progress Note  [facility] - Mental Health  Pt requested that I talk with her following spirituality group this morning.  Pt participated in group and shared appropriately.  After group, pt appeared very restless and anxious stating \"I don't know why I asked to speak with you.\"  She then talked about her involvement at [facility] and that she was trying to get a hold of her .  She would like her pastors involvement in a meeting that is happening tomorrow.    She talked briefly about her concerns about her living arrangement and wanting to have someone live with her.  Pt is feeling very disconnected from her children.    Pt later called me on my office phone, wanting me help contacting her .  When I returned the call and talked with Vanessa, she stated that she had it taken care of.    No other needs at this time.                                                                                                                                                    Lidia Malin M.Div., [facility]  Staff    Pager [phone]  "

## 2017-01-16 PROCEDURE — 97150 GROUP THERAPEUTIC PROCEDURES: CPT | Mod: GO

## 2017-01-16 PROCEDURE — 90847 FAMILY PSYTX W/PT 50 MIN: CPT

## 2017-01-16 PROCEDURE — A9270 NON-COVERED ITEM OR SERVICE: HCPCS | Mod: GY | Performed by: PSYCHIATRY & NEUROLOGY

## 2017-01-16 PROCEDURE — 25000132 ZZH RX MED GY IP 250 OP 250 PS 637: Mod: GY | Performed by: PSYCHIATRY & NEUROLOGY

## 2017-01-16 PROCEDURE — 12400000 ZZH R&B MH

## 2017-01-16 PROCEDURE — 90853 GROUP PSYCHOTHERAPY: CPT

## 2017-01-16 RX ORDER — POLYETHYLENE GLYCOL 3350 17 G/17G
17 POWDER, FOR SOLUTION ORAL DAILY
Status: DISCONTINUED | OUTPATIENT
Start: 2017-01-16 | End: 2017-01-24 | Stop reason: HOSPADM

## 2017-01-16 RX ADMIN — VITAMIN D, TAB 1000IU (100/BT) 2000 UNITS: 25 TAB at 07:55

## 2017-01-16 RX ADMIN — OLANZAPINE 5 MG: 5 TABLET, FILM COATED ORAL at 21:07

## 2017-01-16 RX ADMIN — OLANZAPINE 2.5 MG: 2.5 TABLET, FILM COATED ORAL at 23:34

## 2017-01-16 RX ADMIN — POLYETHYLENE GLYCOL 3350 17 G: 17 POWDER, FOR SOLUTION ORAL at 12:38

## 2017-01-16 RX ADMIN — SIMVASTATIN 40 MG: 20 TABLET, FILM COATED ORAL at 21:07

## 2017-01-16 RX ADMIN — Medication 1 TABLET: at 07:55

## 2017-01-16 RX ADMIN — ASPIRIN 81 MG 81 MG: 81 TABLET ORAL at 07:55

## 2017-01-16 RX ADMIN — CYANOCOBALAMIN TAB 1000 MCG 1000 MCG: 1000 TAB at 07:55

## 2017-01-16 ASSESSMENT — ACTIVITIES OF DAILY LIVING (ADL)
LAUNDRY: WITH SUPERVISION
GROOMING: INDEPENDENT
LAUNDRY: WITH SUPERVISION
DRESS: INDEPENDENT;STREET CLOTHES
ORAL_HYGIENE: INDEPENDENT
DRESS: STREET CLOTHES;INDEPENDENT
GROOMING: INDEPENDENT;SHOWER
ORAL_HYGIENE: INDEPENDENT

## 2017-01-16 NOTE — PLAN OF CARE
Problem: Depressive Symptoms  Goal: Depressive Symptoms  Signs and symptoms of listed problems will be absent or manageable.   Outcome: No Change  Patient presents with a full range affect. She was visible and social during the shift. Patient attended all groups and participated with insight. She appears to be less anxious when talking with peers. Patient has a family meeting today (see social work note). Patient continues to exhibit short term memory. Patient was cooperative and pleasant with staff.

## 2017-01-16 NOTE — PLAN OF CARE
"Problem: Depressive Symptoms  Goal: Depressive Symptoms  Signs and symptoms of listed problems will be absent or manageable.     Pt continues to be anxious with a flat affect. She perseverates on several subjects. Upset that her children aren't coming to her family meeting in the morning. She contacted her [employer] . They had a good conversation and prayed over the phone. This was helpful. This writer found a plastic tub of soiled wash cloths in Pt's room. Explained to Pt that they will be put in the laundry. Pt asked if her room smelled bad, which it did not but eventually would. Pt was apparently confused by this as she asked another staff member why the wash cloths were removed. She wanted to know the \"position\" of this writer. Pt is concerned about her bowels. She said \"My poop is coming out in little marbles like a deer.\" Pt believes this is due to a new medication, but is still troubled by this change. Referred Pt to nursing staff. However, when Pt spoke with nurse, she continued to ask questions about the wash cloths.       "

## 2017-01-16 NOTE — PLAN OF CARE
Problem: Discharge Planning  Goal: Discharge Planning (Adult, OB, Behavioral, Peds)  Meeting held today with patient, Giselapa Weiner, [facility] , Adrian, therapist Yesica [facility], Dr. Stefan Cartagena and myself in attendance.  Patient's son Berhane Waston was on speaker phone.  Discussed that patient's testing is indicating that she has cognitive deficits which indicated clearly that patient needs additional support to live independently.  It is recommended for patient's safety that patient go to an assistive living.  Patient has not been functioning well in her current living situation, as evidenced by current hospitalization and evidence presented by those in attendance of the meeting.  Patient was very reluctant to agree with assisted living and had a rebuttal for every issue and concern that was brought up.  Everyone in meeting agreed that assisted living and additional support and structure are necessary to keep patient safe.  Berhane, patient's son, and Gisela will work together in contacting the assisted living ([facility] in [place]) that her son contacted previously.  Gisela will start process of transferring patient's case to an Elderly Waiver.  The assisted living patient's son was looking into accepts Elderly Waivers.  Patient's son indicated he could help patient bridge the gap until the waiver is in place.  Dr. Cartagena tried to make it clear to patient that her cognitive deficits would not improve even as her depression improves.  Patient did agree by the end of the meeting to pursuing assisted living housing.  [facility] is just minutes from her son who is currently her only family support at this time.  Dr. Cartagena asked that I speak with patient's son about guardianship.  I did this and emailed him information on an agency other than a private  who could help with this process.

## 2017-01-17 PROCEDURE — A9270 NON-COVERED ITEM OR SERVICE: HCPCS | Mod: GY | Performed by: PSYCHIATRY & NEUROLOGY

## 2017-01-17 PROCEDURE — 12400000 ZZH R&B MH

## 2017-01-17 PROCEDURE — 97150 GROUP THERAPEUTIC PROCEDURES: CPT | Mod: GO

## 2017-01-17 PROCEDURE — 25000132 ZZH RX MED GY IP 250 OP 250 PS 637: Mod: GY | Performed by: PSYCHIATRY & NEUROLOGY

## 2017-01-17 PROCEDURE — 90853 GROUP PSYCHOTHERAPY: CPT

## 2017-01-17 RX ADMIN — CYANOCOBALAMIN TAB 1000 MCG 1000 MCG: 1000 TAB at 08:34

## 2017-01-17 RX ADMIN — OLANZAPINE 5 MG: 5 TABLET, FILM COATED ORAL at 19:55

## 2017-01-17 RX ADMIN — ASPIRIN 81 MG 81 MG: 81 TABLET ORAL at 08:34

## 2017-01-17 RX ADMIN — VITAMIN D, TAB 1000IU (100/BT) 2000 UNITS: 25 TAB at 08:34

## 2017-01-17 RX ADMIN — Medication 1 TABLET: at 08:34

## 2017-01-17 RX ADMIN — POLYETHYLENE GLYCOL 3350 17 G: 17 POWDER, FOR SOLUTION ORAL at 08:33

## 2017-01-17 RX ADMIN — OLANZAPINE 2.5 MG: 2.5 TABLET, FILM COATED ORAL at 22:41

## 2017-01-17 RX ADMIN — SIMVASTATIN 40 MG: 20 TABLET, FILM COATED ORAL at 19:55

## 2017-01-17 ASSESSMENT — ACTIVITIES OF DAILY LIVING (ADL)
LAUNDRY: WITH SUPERVISION
GROOMING: INDEPENDENT
ORAL_HYGIENE: INDEPENDENT
ORAL_HYGIENE: INDEPENDENT
DRESS: STREET CLOTHES;INDEPENDENT
LAUNDRY: WITH SUPERVISION
GROOMING: INDEPENDENT
DRESS: STREET CLOTHES
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
## 2017-01-18 PROCEDURE — 12400000 ZZH R&B MH

## 2017-01-18 PROCEDURE — 90853 GROUP PSYCHOTHERAPY: CPT

## 2017-01-18 PROCEDURE — 25000132 ZZH RX MED GY IP 250 OP 250 PS 637: Mod: GY | Performed by: PSYCHIATRY & NEUROLOGY

## 2017-01-18 PROCEDURE — 97150 GROUP THERAPEUTIC PROCEDURES: CPT | Mod: GO

## 2017-01-18 PROCEDURE — A9270 NON-COVERED ITEM OR SERVICE: HCPCS | Mod: GY | Performed by: PSYCHIATRY & NEUROLOGY

## 2017-01-18 RX ADMIN — POLYETHYLENE GLYCOL 3350 17 G: 17 POWDER, FOR SOLUTION ORAL at 08:09

## 2017-01-18 RX ADMIN — VITAMIN D, TAB 1000IU (100/BT) 2000 UNITS: 25 TAB at 08:09

## 2017-01-18 RX ADMIN — ASPIRIN 81 MG 81 MG: 81 TABLET ORAL at 08:09

## 2017-01-18 RX ADMIN — Medication 1 TABLET: at 08:09

## 2017-01-18 RX ADMIN — SIMVASTATIN 40 MG: 20 TABLET, FILM COATED ORAL at 21:11

## 2017-01-18 RX ADMIN — CYANOCOBALAMIN TAB 1000 MCG 1000 MCG: 1000 TAB at 08:09

## 2017-01-18 RX ADMIN — OLANZAPINE 5 MG: 5 TABLET, FILM COATED ORAL at 21:11

## 2017-01-18 NOTE — PLAN OF CARE
Problem: Discharge Planning  Goal: Discharge Planning (Adult, OB, Behavioral, Peds)   received a call today from patient's daughter, Vanessa. She stated that she was gone over the weekend so did not receive the multiple messages left last week by .  briefly reviewed the plan discussed at the family meeting on Monday which was attended by patient's son, Berhane, as well as patient's county , [place]. She was encouraged to discuss further details with her brother as he is the one looking into assisted living options. She stated that she has a strained relationship with her brother following the death of their father five years ago.      called and spoke with patient's son, Berhane Watson ([phone]), regarding progress on plan for patient's disposition. He stated that he has spoken with Jazmin Rodriguez,  at [facility]. He has reserved a room for patient and put down a deposit on the room. He stated that patient will be able to have her piano and cats at this assisted living. He stated that they do take elder care waivers. He stated that he has completed the paperwork for the elder care waiver and faxed it to patient's county , [place]. He stated that it could take at least 30 days for the waiver even if it is fast tracked. He stated that it may be possible for patient to pay for the assisted living until the elder care waiver is in place. He stated that he is going to look into this as the unit where she will be going is vacant currently. Patient's son stated that he will need to get patient out of the lease on her current apartment. He is requesting a letter from patient's physician which states that patient needs to break the lease in order to go to a place with an increased level of care.     Patient's son stated that he does have power of  over patient. He stated that he will email the paperwork to  . His email address is [email or web address].  He is currently applying for guardianship of patient. He states that he has been managing patient's finances for the past ten months. He stated that patient does trust him but she is resistant to having him run her life. He stated that he is willing to work with physician and care team to ensure a good transition for patient to an assisted living. He stated that if patient can not immediately get into the assisted living that she could possibly stay with him in the interim.  discussed his sister's call earlier and he briefly touched on family dynamics. He stated that if patient's daughter calls the unit again that she can be directed to speak with him as he is the one making arrangements for patient.     Patient's [facility]  [place] ([phone]), called back and left a message reiterating much of what the son had already said. She stated that she remains willing to assist with patient's transition to assisted living.      received a call from Lidia ([phone]), a registered nurse at [facility], wanting to get information on patient. Patient has not yet signed a release of information form for [facility], so  will need to obtain consent from patient before further discussing her case with [facility].
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
## 2017-01-18 NOTE — PROGRESS NOTES
"[facility] Psychiatric Progress Note       Interim History   The patient's care was discussed with the treatment team and chart notes were reviewed. Pt seen on SDU. Tolerating medications without side effects. Side effects, risks, and benefits of medications reviewed with patient. Pt is awake early, she states that she wakes up at 0500 every morning. Pt stated she is unable to \"I'm just struggling with the indecision with everything.\" Dr. Cartagena informed pt that she is currently impaired in decision-making and her decisions have already been made by her support system in her best interest. Pt is perseverative on keeping her car, cats, and residence. Pt also complains about her son, who is also a realtor, that \"he just doesn't listen to me.\" Dr. Cartagena assured her that her son is attempting to set up assisted living for her through Elderly Waiver. She remains anxious and paranoid, although this may subside in the future with consistent reinforcement of why she needs to go to assisted living. Denies suicidal or homicidal ideation.      Medications     Current Facility-Administered Medications:    polyethylene glycol  17 g Oral Daily     simvastatin (ZOCOR) tablet 40 mg  40 mg Oral At Bedtime     OLANZapine  5 mg Oral At Bedtime     aspirin chewable tablet 81 mg  81 mg Oral Daily     calcium-vitamin D  1 tablet Oral Daily     cholecalciferol  2,000 Units Oral Daily     cyanocobalamin  1,000 mcg Oral Daily     PRNs:  pramox-pe-glycerin-petrolatum, OLANZapine, acetaminophen      Allergies    No Known Allergies     Medical Review of Systems   /64 mmHg  Pulse 79  Temp(Src) 97.9  F (36.6  C) (Oral)  Resp 16  Ht 1.549 m (5' 1\")  Wt 61.372 kg (135 lb 4.8 oz)  BMI 25.58 kg/m2  SpO2 94%  Body mass index is 25.58 kg/(m^2).  A 10-point review of systems was performed by Dr. Cartagena and is negative, no new findings.      Psychiatric Examination     Appearance Sitting in chair, dressed in " casual clothes. Appears stated age.   Attitude Cooperative, remains paranoid   Orientation Oriented to person, place, time   Eye Contact Fair   Speech Regular rate, rhythm, volume and tone   Language Normal   Psychomotor Behavior Normal   Mood Remains anxious   Affect Remains dramatic   Thought Process Intact, Linear   Associations Intact   Thought Content Patient is currently negative for suicide ideation, negative for plan or intent, able to contract no self harm and identify barriers to suicide.  Negative for obsessions, compulsions or psychosis.      Fund of Knowledge Average   Insight Stagnant   Judgement Stagnant   Attention Span & Concentration Impaired   Recent & Remote Memory Poor   Gait Normal          Labs   Labs reviewed.  No results found for this or any previous visit (from the past 24 hour(s)).       Impression     The patient is an 85-year-old woman presenting hyperverbal, anxious, sleep deprived. She has been able to obtain sleep with Zyprexa and has been improving. She will likely follow up with Dr. Ferrer on discharge.     Diagnoses   1. Dementia with behavioral disturbance.  2.  Rule out bipolar disorder, type 2, most recent episode hypomanic.      Plan     1. Explained side effects, benefits, and complications of medications to the patient, Pt gave verbal consent.  2. Medication changes: Continue Zyprexa 5mg qhs.  3. Discussed treatment plan with patient and team.  4. Projected length of stay: 2-3 days, until pt has been stabilized.  5. Pt's son to look into placement at [facility] and possible guardianship.    Attestation:   Patient has been seen and evaluated by me, Stefan Cartagena MD.    Patient ID:  Name: Vanessa Watson   MRN: [id number]  Admission: 1/6/2017   YOB: 1931

## 2017-01-18 NOTE — PLAN OF CARE
Problem: General Rehab Plan of Care  Goal: Occupational Therapy Goals  The patient and/or their representative will achieve their patient-specific goals related to the plan of care.  The patient-specific goals include:  Assess cognition as needed.   Pt attended OT group today, arrived late. She was alert and appeared attentive but did not make any comments. Therefore, unable to assess comprehension. Thinking remains perseverative. Pt approached this writer about her CPT score today and continues to argue that her cognition is not as bad as the test suggests. In OT group over the past three days, pt has made a number of requests to staff and peers, asking others to do things for her. She becomes easily frustrated when problem arise and does not engage in problem solving, opting to ask others to solve the issue for her. Cognitive deficits remain apparent.

## 2017-01-18 NOTE — PLAN OF CARE
Problem: Discharge Planning  Goal: Discharge Planning (Adult, OB, Behavioral, Peds)  Attended process group on 1/16, 1/17 and 1/18.Participation complete. Needed reminders and reinterpretation to follow train of thought.

## 2017-01-19 PROCEDURE — 25000132 ZZH RX MED GY IP 250 OP 250 PS 637: Mod: GY | Performed by: PSYCHIATRY & NEUROLOGY

## 2017-01-19 PROCEDURE — 90853 GROUP PSYCHOTHERAPY: CPT

## 2017-01-19 PROCEDURE — 12400000 ZZH R&B MH

## 2017-01-19 PROCEDURE — A9270 NON-COVERED ITEM OR SERVICE: HCPCS | Mod: GY | Performed by: PSYCHIATRY & NEUROLOGY

## 2017-01-19 RX ADMIN — ASPIRIN 81 MG 81 MG: 81 TABLET ORAL at 09:14

## 2017-01-19 RX ADMIN — POLYETHYLENE GLYCOL 3350 17 G: 17 POWDER, FOR SOLUTION ORAL at 09:14

## 2017-01-19 RX ADMIN — VITAMIN D, TAB 1000IU (100/BT) 2000 UNITS: 25 TAB at 09:14

## 2017-01-19 RX ADMIN — Medication 1 TABLET: at 09:14

## 2017-01-19 RX ADMIN — CYANOCOBALAMIN TAB 1000 MCG 1000 MCG: 1000 TAB at 09:14

## 2017-01-19 RX ADMIN — SIMVASTATIN 40 MG: 20 TABLET, FILM COATED ORAL at 21:41

## 2017-01-19 RX ADMIN — OLANZAPINE 5 MG: 5 TABLET, FILM COATED ORAL at 21:40

## 2017-01-19 RX ADMIN — OLANZAPINE 2.5 MG: 2.5 TABLET, FILM COATED ORAL at 23:17

## 2017-01-19 ASSESSMENT — ACTIVITIES OF DAILY LIVING (ADL)
GROOMING: INDEPENDENT
LAUNDRY: WITH SUPERVISION
ORAL_HYGIENE: INDEPENDENT
DRESS: STREET CLOTHES;INDEPENDENT

## 2017-01-19 NOTE — PLAN OF CARE
Problem: Discharge Planning  Goal: Discharge Planning (Adult, OB, Behavioral, Peds)   met with patient this morning to have her sign a release of information form for [facility]. Patient expressed some paranoia regarding signing the PETROS. She stated that she does not want to commit to going to this assisted living facility. She also stated that she does not want people to know that she is in a psych price.  deferred having her sign the PETROS for now. Patient showed  a list of concerns that she has including being able to drive again.  discussed with patient that as she has a diagnosis of dementia that her physician is mandated to send a letter to the V revoking her driving privileges.  reviewed with her the dangers posed if she were to drive and have memory lapses. Patient acknowledged the danger and is now accepting of not driving. She then stated that her daughter is now looking into placing her somewhere in [place] where the daughter lives. This is not part of the plan which was put in place with the son, county  and physician at the discharge planning meeting on Monday.  placed a call to patient's son. He is aware that his sister had been calling places all day yesterday trying to put something together as an alternate placement. In the end he just wants patient to be in a safe place and have her needs met. However he would prefer to go forward with the plan put in place during the meeting as this was what was agreed upon and what he has been working on. He reiterated that he is power of  for patient.

## 2017-01-19 NOTE — PLAN OF CARE
"Problem: Depressive Symptoms  Goal: Depressive Symptoms  Signs and symptoms of listed problems will be absent or manageable.   Outcome: Therapy, progress toward functional goals is gradual  Pt appears more accepting of the need to change residence. Pt 's memory is poor and the acceptance and understanding changes. In 1:1 Pt requested that the doctor be given the information on [facility], Pt said, \"I want to live there because it is close to my daughter.\"  Information was given to Dr. Cartagena.  Pt is social with peers,  Speech is pressured, pt appears more focused. Pt has accepted the need to find alternative housing.        "

## 2017-01-19 NOTE — PROGRESS NOTES
"[facility] Psychiatric Progress Note       Interim History   The patient's care was discussed with the treatment team and chart notes were reviewed. Pt seen on SDU. Tolerating medications without side effects. Side effects, risks, and benefits of medications reviewed with patient. Pt states that her daughter is her \"soulmate, she's just the most capable, beautiful women.\" Pt's  passed away five years ago and his will dictated that he left his money to his son rather than her daughter. This has precipitated much conflict between pt's daughter and son to the point that they have not spoken to each other for four years. Pt's daughter had finally contacted the station and had apparently set up two possible assisted living locations in [place]. She still has not contacted Dr. Cartagena directly despite being given his direct contact number. Pt states that \"she has just been so busy for meetings.\"      Pt would like to discharge today with a \".\" Dr. Cartagena informed her that she cannot be discharged without 24/7 supervision. Dr. Cartagena also informed her that if her residence is not covered by Elderly Waiver that she will end up spending private money. Pt is quite upset about the prospect of elderly waiver, \"I lead a very affluent lifestyle and to carry an ABT card in my wallet is ridiculous.\" Encouraged pt's daughter to contact her  to inform her of the alternative placement option. Informed pt that a letter will be sent to the [place] DMV to revoke her license as she has impaired judgement. Denies suicidal or homicidal ideation.      Medications     Current Facility-Administered Medications:    polyethylene glycol  17 g Oral Daily     simvastatin (ZOCOR) tablet 40 mg  40 mg Oral At Bedtime     OLANZapine  5 mg Oral At Bedtime     aspirin chewable tablet 81 mg  81 mg Oral Daily     calcium-vitamin D  1 tablet Oral Daily     cholecalciferol  2,000 Units Oral Daily     " "cyanocobalamin  1,000 mcg Oral Daily     PRNs:  pramox-pe-glycerin-petrolatum, OLANZapine, acetaminophen      Allergies    No Known Allergies     Medical Review of Systems   /29 mmHg  Pulse 78  Temp(Src) 97.7  F (36.5  C) (Oral)  Resp 16  Ht 1.549 m (5' 1\")  Wt 61.372 kg (135 lb 4.8 oz)  BMI 25.58 kg/m2  SpO2 94%  Body mass index is 25.58 kg/(m^2).  A 10-point review of systems was performed by Dr. Cartagena and is negative, no new findings.      Psychiatric Examination     Appearance Sitting in chair, dressed in casual clothes. Appears stated age.   Attitude Cooperative, remains paranoid, forgetful   Orientation Oriented to person, place, time   Eye Contact Fair   Speech Regular rate, rhythm, volume and tone   Language Normal   Psychomotor Behavior Normal   Mood Remains anxious   Affect Remains dramatic   Thought Process Intact, Linear   Associations Intact   Thought Content Patient is currently negative for suicide ideation, negative for plan or intent, able to contract no self harm and identify barriers to suicide.  Negative for obsessions, compulsions or psychosis.      Fund of Knowledge Average   Insight Stagnant   Judgement Stagnant   Attention Span & Concentration Impaired   Recent & Remote Memory Poor   Gait Normal          Labs   Labs reviewed.  No results found for this or any previous visit (from the past 24 hour(s)).       Impression     The patient is an 85-year-old woman presenting hyperverbal, anxious, sleep deprived. She has been able to obtain sleep with Zyprexa and has been improving. She will likely follow up with Dr. Ferrer on discharge.     Diagnoses   1. Dementia with behavioral disturbance.  2.  Rule out bipolar disorder, type 2, most recent episode hypomanic.      Plan     1. Explained side effects, benefits, and complications of medications to the patient, Pt gave verbal consent.  2. Medication changes: Continue Zyprexa 5mg qhs.  3. Discussed treatment plan with patient and " team.  4. Projected length of stay: 2-3 days, until pt has been stabilized.  5. Pt's son to look into placement at [facility] and possible guardianship.  6. Coordinate with pt's daughter for alternative placement.    Attestation:   Patient has been seen and evaluated by me, Stefan Cartagena MD.    Patient ID:  Name: Vanessa Watson   MRN: [id number]  Admission: 1/6/2017   YOB: 1931

## 2017-01-19 NOTE — PROGRESS NOTES
"Delia is obsessing about her bowels.  She said \" I am having this little gautam and they smell. Patient  came to the desk several times .  \" I am worry about my bowels \".  On assessment BM look normal in color and shape, Informed patient that everything was fine.  She said \" I feel relieved\".  "

## 2017-01-19 NOTE — PLAN OF CARE
Problem: Depressive Symptoms  Goal: Depressive Symptoms  Signs and symptoms of listed problems will be absent or manageable.   Outcome: No Change  Patient was present on the unit all shift. She engaged in multiple phone calls this evening. Patient attended wrap up group with proper participation. Patient displayed short term memory loss, on multiple occassions did not remember having met the RN despite having worked with him all evening.

## 2017-01-20 PROCEDURE — 25000132 ZZH RX MED GY IP 250 OP 250 PS 637: Mod: GY | Performed by: PSYCHIATRY & NEUROLOGY

## 2017-01-20 PROCEDURE — A9270 NON-COVERED ITEM OR SERVICE: HCPCS | Mod: GY | Performed by: PSYCHIATRY & NEUROLOGY

## 2017-01-20 PROCEDURE — 90853 GROUP PSYCHOTHERAPY: CPT

## 2017-01-20 PROCEDURE — 12400000 ZZH R&B MH

## 2017-01-20 RX ADMIN — CYANOCOBALAMIN TAB 1000 MCG 1000 MCG: 1000 TAB at 08:07

## 2017-01-20 RX ADMIN — Medication 1 TABLET: at 08:07

## 2017-01-20 RX ADMIN — ASPIRIN 81 MG 81 MG: 81 TABLET ORAL at 08:07

## 2017-01-20 RX ADMIN — POLYETHYLENE GLYCOL 3350 17 G: 17 POWDER, FOR SOLUTION ORAL at 08:07

## 2017-01-20 RX ADMIN — SIMVASTATIN 40 MG: 20 TABLET, FILM COATED ORAL at 21:04

## 2017-01-20 RX ADMIN — OLANZAPINE 5 MG: 5 TABLET, FILM COATED ORAL at 21:04

## 2017-01-20 RX ADMIN — VITAMIN D, TAB 1000IU (100/BT) 2000 UNITS: 25 TAB at 08:07

## 2017-01-20 ASSESSMENT — ACTIVITIES OF DAILY LIVING (ADL)
GROOMING: INDEPENDENT
ORAL_HYGIENE: INDEPENDENT
LAUNDRY: WITH SUPERVISION
DRESS: STREET CLOTHES

## 2017-01-20 NOTE — PLAN OF CARE
Problem: Discharge Planning  Goal: Discharge Planning (Adult, OB, Behavioral, Peds)  Patient attended Process Group on Thursday 01/19/17 and participated appropriately. Patient demonstrated emerging insight into the topic of discussion.

## 2017-01-20 NOTE — PLAN OF CARE
Problem: Discharge Planning  Goal: Discharge Planning (Adult, OB, Behavioral, Peds)   called and left a message with patient's son, Berhane Watson ([phone]) notifying him that physician supports going forward with the plan for discharge put in place on Monday at the family meeting which patient's son attended via phone.  will contact son early next week to find out a time line for patient being admitted to [facility] or her son's home in the interim.

## 2017-01-20 NOTE — PROGRESS NOTES
"[facility] Psychiatric Progress Note       Interim History   The patient's care was discussed with the treatment team and chart notes were reviewed. Pt seen on SDU. Tolerating medications without side effects. Side effects, risks, and benefits of medications reviewed with patient. Pt states that she has an appointment at the [facility] at 1500 today with her son. She did not inform Dr. Cartagena until today and will not be allowed to discharge for that reason. Pt acknowledged. Pt would like to know when she can be discharged. Informed pt that she does not have any services in place and she is a vulnerable adult, so she cannot be discharged. If her son will provide her 24/7 supervision, then she will be able to discharge. Pt continues to ruminate on discharging. Denies suicidal or homicidal ideation.      Medications     Current Facility-Administered Medications:    polyethylene glycol  17 g Oral Daily     simvastatin (ZOCOR) tablet 40 mg  40 mg Oral At Bedtime     OLANZapine  5 mg Oral At Bedtime     aspirin chewable tablet 81 mg  81 mg Oral Daily     calcium-vitamin D  1 tablet Oral Daily     cholecalciferol  2,000 Units Oral Daily     cyanocobalamin  1,000 mcg Oral Daily     PRNs:  pramox-pe-glycerin-petrolatum, OLANZapine, acetaminophen      Allergies    No Known Allergies     Medical Review of Systems   BP 99/59 mmHg  Pulse 90  Temp(Src) 98.7  F (37.1  C) (Oral)  Resp 16  Ht 1.549 m (5' 1\")  Wt 61.372 kg (135 lb 4.8 oz)  BMI 25.58 kg/m2  SpO2 94%  Body mass index is 25.58 kg/(m^2).  A 10-point review of systems was performed by Dr. Cartagena and is negative, no new findings.      Psychiatric Examination     Appearance Sitting in chair, dressed in casual clothes. Appears stated age.   Attitude Cooperative, remains paranoid, forgetful, ruminative   Orientation Oriented to person, place, time   Eye Contact Fair   Speech Regular rate, rhythm, volume and tone   Language Normal   Psychomotor " Behavior Normal   Mood Remains anxious, somewhat depressed   Affect Remains dramatic   Thought Process Intact, Linear   Associations Intact   Thought Content Patient is currently negative for suicide ideation, negative for plan or intent, able to contract no self harm and identify barriers to suicide.  Negative for obsessions, compulsions or psychosis.      Fund of Knowledge Average   Insight Stagnant   Judgement Stagnant   Attention Span & Concentration Impaired   Recent & Remote Memory Poor   Gait Normal          Labs   Labs reviewed.  No results found for this or any previous visit (from the past 24 hour(s)).       Impression     The patient is an 85-year-old woman presenting hyperverbal, anxious, sleep deprived. She has been able to obtain sleep with Zyprexa and has been improving. She will likely follow up with Dr. Ferrer on discharge.     Diagnoses   1. Dementia with behavioral disturbance.  2.  Rule out bipolar disorder, type 2, most recent episode hypomanic.      Plan     1. Explained side effects, benefits, and complications of medications to the patient, Pt gave verbal consent.  2. Medication changes: Continue Zyprexa 5mg qhs.  3. Discussed treatment plan with patient and team.  4. Projected length of stay: Until pt has been stabilized.  5. Pt's son to look into placement at [facility] and possible guardianship.  6. Coordinate with pt's daughter for alternative placement.    Attestation:   Patient has been seen and evaluated by me, Stefan Cartagena MD.    Patient ID:  Name: Vanessa Watson   MRN: [id number]  Admission: 1/6/2017   YOB: 1931

## 2017-01-20 NOTE — PROGRESS NOTES
SPIRITUAL HEALTH SERVICES Progress Note  [facility] 77    Pt requested visit after SH greeted her upon SH arriving on the unit.  Pt expresses that she feels her doctor and son are revoking her independence and freedom.  Also laments that she has not received visitors in her two weeks on the unit.  Pt seems to understand the need for a greater level of care than her current independent living situation, however she does not want to be in [place], where she would be near to her son but away from [facility] and the [facility] where she feels genuine connection.  SH provided prayer and a Bible with bookmarks at the Psas and Lamentations.      SH will f/u upon pt request.                                                                                                                                           Ariana Calero M.Div.  Chaplain Resident  Pager [phone]

## 2017-01-20 NOTE — PLAN OF CARE
"Problem: Individualization  Goal: Patient Preferences  Outcome: No Change  Pleasant, cheerful, socialized with peers.  Concerned and anxious about finances, made several phone calls, stated \"  I made an appointment today at 3:00 p.m with my banker, tell the Dr. I need a ride today\"  Patient has short term memory, ruminates .         "

## 2017-01-21 PROCEDURE — 12400000 ZZH R&B MH

## 2017-01-21 PROCEDURE — A9270 NON-COVERED ITEM OR SERVICE: HCPCS | Mod: GY | Performed by: PSYCHIATRY & NEUROLOGY

## 2017-01-21 PROCEDURE — 25000132 ZZH RX MED GY IP 250 OP 250 PS 637: Mod: GY | Performed by: PSYCHIATRY & NEUROLOGY

## 2017-01-21 RX ORDER — OLANZAPINE 2.5 MG/1
2.5 TABLET, FILM COATED ORAL EVERY MORNING
Status: DISCONTINUED | OUTPATIENT
Start: 2017-01-21 | End: 2017-01-21

## 2017-01-21 RX ORDER — OLANZAPINE 2.5 MG/1
2.5 TABLET, FILM COATED ORAL EVERY MORNING
Status: DISCONTINUED | OUTPATIENT
Start: 2017-01-22 | End: 2017-01-24 | Stop reason: HOSPADM

## 2017-01-21 RX ADMIN — ASPIRIN 81 MG 81 MG: 81 TABLET ORAL at 07:37

## 2017-01-21 RX ADMIN — VITAMIN D, TAB 1000IU (100/BT) 2000 UNITS: 25 TAB at 07:37

## 2017-01-21 RX ADMIN — CYANOCOBALAMIN TAB 1000 MCG 1000 MCG: 1000 TAB at 07:37

## 2017-01-21 RX ADMIN — Medication 1 TABLET: at 07:37

## 2017-01-21 RX ADMIN — POLYETHYLENE GLYCOL 3350 17 G: 17 POWDER, FOR SOLUTION ORAL at 07:37

## 2017-01-21 RX ADMIN — OLANZAPINE 2.5 MG: 2.5 TABLET, FILM COATED ORAL at 07:52

## 2017-01-21 RX ADMIN — OLANZAPINE 5 MG: 5 TABLET, FILM COATED ORAL at 21:10

## 2017-01-21 RX ADMIN — SIMVASTATIN 40 MG: 20 TABLET, FILM COATED ORAL at 21:10

## 2017-01-21 ASSESSMENT — ACTIVITIES OF DAILY LIVING (ADL)
DRESS: STREET CLOTHES
ORAL_HYGIENE: INDEPENDENT
ORAL_HYGIENE: INDEPENDENT
DRESS: STREET CLOTHES
GROOMING: INDEPENDENT
GROOMING: INDEPENDENT

## 2017-01-21 NOTE — PLAN OF CARE
Problem: Depressive Symptoms  Goal: Depressive Symptoms  Signs and symptoms of listed problems will be absent or manageable.   Outcome: No Change  Pt has been present on the unit. Pt has been pacing the florez and has been anxious this afternoon. Pt was on the phone making several phone calls and spent a majority of the shift taking about her cats.Pt is worried about her plan. Pt is wanting to stay around [place] and has spoke to her daughter who is willing to help her with this per the pt. Pt is hoping that he daughter can find her a place to live. Pt is not interested in the plan set up by her son. Pt is very anxious and perseverative when taking with staff. Pt is redirectable and encouraged to talk to the dr and her son as he is the POA.

## 2017-01-21 NOTE — PROGRESS NOTES
"[facility] Psychiatric Progress Note       Interim History   The patient's care was discussed with the treatment team and chart notes were reviewed. Pt seen on SDU. Tolerating medications without side effects. Side effects, risks, and benefits of medications reviewed with patient. Pt had called her bank and they claimed that they could not find her money in her personal account as she believes that her son has taken all her money. Informed pt yet again that her memory is impaired, pt stated, \"Please don't say that, I am quite meticulous and I just haven't touched my laptop in a month, I am a competent woman.\" Pt is perseverative on discharging to [place] with her daughter despite her son having power of . Pt has written down \"I want to be in a home of my choice, this is my life, not Berhane's life.\" Pt would like to discharge to her friend's place in the interim waiting to be placed in an assisted living facility. Again stated that pt cannot be discharged to her residence. Informed pt that pt's daughter had not contacted Dr. Cartagena despite the fact that he had given her a direct number for a doctor-to-doctor discussion. He informed pt that has her daughter been disruptive in pt's recovery and placement despite the fact that pt believes that she has been helping her. Pt states that \"My heart will break if I go there [[place]], it's been disappointment after disappointment.\" Again informed pt that the plan at the family meeting is currently in place going forward. Begin Zyprexa 2.5mg qam as she has been taking PRN every morning. Denies suicidal or homicidal ideation.      Medications     Current Facility-Administered Medications:    polyethylene glycol  17 g Oral Daily     simvastatin (ZOCOR) tablet 40 mg  40 mg Oral At Bedtime     OLANZapine  5 mg Oral At Bedtime     aspirin chewable tablet 81 mg  81 mg Oral Daily     calcium-vitamin D  1 tablet Oral Daily     cholecalciferol  " "2,000 Units Oral Daily     cyanocobalamin  1,000 mcg Oral Daily     PRNs:  pramox-pe-glycerin-petrolatum, OLANZapine, acetaminophen      Allergies    No Known Allergies     Medical Review of Systems   /69 mmHg  Pulse 82  Temp(Src) 97.8  F (36.6  C) (Oral)  Resp 15  Ht 1.549 m (5' 1\")  Wt 61.372 kg (135 lb 4.8 oz)  BMI 25.58 kg/m2  SpO2 94%  Body mass index is 25.58 kg/(m^2).  A 10-point review of systems was performed by Dr. Cartagena and is negative, no new findings.      Psychiatric Examination     Appearance Sitting in chair, dressed in casual clothes. Appears stated age.   Attitude Cooperative, remains paranoid, forgetful, ruminative   Orientation Oriented to person, place, time   Eye Contact Fair   Speech Regular rate, rhythm, volume and tone   Language Normal   Psychomotor Behavior Agitated   Mood Remains anxious, somewhat depressed   Affect Remains dramatic   Thought Process Linear   Associations Intact   Thought Content Patient is currently negative for suicide ideation, negative for plan or intent, able to contract no self harm and identify barriers to suicide.  Negative for obsessions, compulsions or psychosis.      Fund of Knowledge Average   Insight Stagnant   Judgement Stagnant   Attention Span & Concentration Poor   Recent & Remote Memory Poor   Gait Normal          Labs   Labs reviewed.  No results found for this or any previous visit (from the past 24 hour(s)).       Impression     The patient is an 85-year-old woman presenting hyperverbal, anxious, sleep deprived. She has been able to obtain sleep with Zyprexa and has been improving. She will likely discharge to assisted living in [place] or [place].     Diagnoses   1. Dementia with behavioral disturbance.  2.  Rule out bipolar disorder, type 2, most recent episode hypomanic.      Plan     1. Explained side effects, benefits, and complications of medications to the patient, Pt gave verbal consent.  2. Medication changes: " Continue Zyprexa 5mg qhs. Begin Zyprexa 2.5mg qam.  3. Discussed treatment plan with patient and team.  4. Projected length of stay: Until pt has been stabilized.  5. Pt's son to look into placement at [facility] and possible guardianship.  6. Coordinate with pt's daughter for alternative placement.    Attestation:   Patient has been seen and evaluated by me, Stefan Cartagena MD.    Patient ID:  Name: Vanessa Watson   MRN: [id number]  Admission: 1/6/2017   YOB: 1931

## 2017-01-21 NOTE — PLAN OF CARE
Problem: Depressive Symptoms  Goal: Depressive Symptoms  Signs and symptoms of listed problems will be absent or manageable.   Outcome: Improving  Pleasant and present on the unit. Had her hair cut this morning by a stylist. Very happy with the results. In good spirits. Spent the morning in her room. Tolerating meals and taking medications as ordered.

## 2017-01-22 PROCEDURE — A9270 NON-COVERED ITEM OR SERVICE: HCPCS | Mod: GY | Performed by: PSYCHIATRY & NEUROLOGY

## 2017-01-22 PROCEDURE — 25000132 ZZH RX MED GY IP 250 OP 250 PS 637: Mod: GY | Performed by: PSYCHIATRY & NEUROLOGY

## 2017-01-22 PROCEDURE — 12400000 ZZH R&B MH

## 2017-01-22 RX ADMIN — Medication 1 TABLET: at 08:54

## 2017-01-22 RX ADMIN — ASPIRIN 81 MG 81 MG: 81 TABLET ORAL at 08:54

## 2017-01-22 RX ADMIN — VITAMIN D, TAB 1000IU (100/BT) 2000 UNITS: 25 TAB at 08:53

## 2017-01-22 RX ADMIN — OLANZAPINE 5 MG: 5 TABLET, FILM COATED ORAL at 20:56

## 2017-01-22 RX ADMIN — CYANOCOBALAMIN TAB 1000 MCG 1000 MCG: 1000 TAB at 08:54

## 2017-01-22 RX ADMIN — OLANZAPINE 2.5 MG: 2.5 TABLET, FILM COATED ORAL at 09:17

## 2017-01-22 RX ADMIN — SIMVASTATIN 40 MG: 20 TABLET, FILM COATED ORAL at 20:57

## 2017-01-22 NOTE — PROGRESS NOTES
"Patient requested to speak to staff. States she is very upset that she was given a cognitive test when she first arrived and she was under so much stress and nervous that she did not do well. She stated \"I am very capable\". Tearful and upset patient expresses that she would very much like to take the test again when she is more rested. States that MD she saw last summer said she does not have dementia, that situational stress causes her to be forgetful.   "

## 2017-01-22 NOTE — PROGRESS NOTES
SPIRITUAL HEALTH SERVICES  Progress Note  [facility]    Pt attended Sunday service and then wanted to talk afterwards.  She was tearful during the service about what might be her living situation when she leaves 77.  After the service, she asked if we could talk.  She reiterated the same concerns and anxiety about where she will end up living.  Pt kept talking about all of the great services that around her current living situation.  Pt did mention some memory issues.  SH listened reflectively and shared a prayer and meditation booklet with pt.                    Priya Crum  Chaplain Resident  Pager [phone]

## 2017-01-22 NOTE — PROGRESS NOTES
Client very angry with Son over financial and housing concerns. Son called and spoke to this writer. 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  He will try to come see her tomorrow. Patient very upset with family.

## 2017-01-23 PROCEDURE — A9270 NON-COVERED ITEM OR SERVICE: HCPCS | Mod: GY | Performed by: PSYCHIATRY & NEUROLOGY

## 2017-01-23 PROCEDURE — 25000132 ZZH RX MED GY IP 250 OP 250 PS 637: Mod: GY | Performed by: PSYCHIATRY & NEUROLOGY

## 2017-01-23 PROCEDURE — 90853 GROUP PSYCHOTHERAPY: CPT

## 2017-01-23 PROCEDURE — 12400000 ZZH R&B MH

## 2017-01-23 RX ADMIN — SIMVASTATIN 40 MG: 20 TABLET, FILM COATED ORAL at 21:59

## 2017-01-23 RX ADMIN — ASPIRIN 81 MG 81 MG: 81 TABLET ORAL at 08:52

## 2017-01-23 RX ADMIN — OLANZAPINE 5 MG: 5 TABLET, FILM COATED ORAL at 21:59

## 2017-01-23 RX ADMIN — VITAMIN D, TAB 1000IU (100/BT) 2000 UNITS: 25 TAB at 08:51

## 2017-01-23 RX ADMIN — OLANZAPINE 2.5 MG: 2.5 TABLET, FILM COATED ORAL at 08:51

## 2017-01-23 RX ADMIN — Medication 1 TABLET: at 08:52

## 2017-01-23 RX ADMIN — CYANOCOBALAMIN TAB 1000 MCG 1000 MCG: 1000 TAB at 08:52

## 2017-01-23 NOTE — PROGRESS NOTES
"[facility] Psychiatric Progress Note       Interim History   The patient's care was discussed with the treatment team and chart notes were reviewed. Pt seen on SDU. Tolerating medications without side effects. Side effects, risks, and benefits of medications reviewed with patient. Pt has written down an extensive list of questions for MD. She would like to retake the OT test as she believes that she would do better if it was not in the afternoon. Dr. Cartagena informed her that her functioning will remain impaired and she is unable to drive. Also informed pt that she will have to be provided food as she will need assistance cooking meals. It will be more pertinent to her recovery if she is less defensive and focused on her placement and going forward with the plan that had been in place on the family meeting. She also believes that Dr. Solis had cleared her mentally and she does not have dementia. Dr. Cartagena reminded pt that when she was initially admitted to [facility], she was frustrated at him and his indifferent approach to her care. Pt to sign PETROS for Dr. Solis. She was questioning why her Zyprexa 2.5mg had become scheduled, Dr. Cartagena informed her that she had been taking this almost every morning, hence why it is now scheduled. Pt would like Dr. Cartagena to contact her daughter to update her with pt's condition. Pt had spoken with her at length yesterday. Pt stated, \"Berhane had been up and down ever since I've been here. It's so bad I was sobbing the other night with Berhane.\" Pt would like to have a second opinion, informed her that chart review revealed two separate documentation results with Dr. Rodriguez and Dr. Quigley in April that she has hallmark signs of dementia. Pt also stated that she has to meet with a Paulina Ortiz on 2/9/17 for a verbal test for 2 1/2 hours, informed pt that she had taken this same test in April with Dr. Quigley. Dr. Cartagena plans to call pt's son to " "determine where she will discharge to in the interim before she will be placed in assisted living. Denies suicidal or homicidal ideation.      Medications     Current Facility-Administered Medications:    OLANZapine  2.5 mg Oral QAM     polyethylene glycol  17 g Oral Daily     simvastatin (ZOCOR) tablet 40 mg  40 mg Oral At Bedtime     OLANZapine  5 mg Oral At Bedtime     aspirin chewable tablet 81 mg  81 mg Oral Daily     calcium-vitamin D  1 tablet Oral Daily     cholecalciferol  2,000 Units Oral Daily     cyanocobalamin  1,000 mcg Oral Daily     PRNs:  pramox-pe-glycerin-petrolatum, acetaminophen      Allergies    No Known Allergies     Medical Review of Systems   /57 mmHg  Pulse 80  Temp(Src) 98  F (36.7  C) (Oral)  Resp 16  Ht 1.549 m (5' 1\")  Wt 61.372 kg (135 lb 4.8 oz)  BMI 25.58 kg/m2  SpO2 94%  Body mass index is 25.58 kg/(m^2).  A 10-point review of systems was performed by Dr. Cartagena and is negative, no new findings.      Psychiatric Examination     Appearance Sitting in chair, dressed in casual clothes. Appears stated age.   Attitude Cooperative, remains paranoid, forgetful, ruminative   Orientation Oriented to person, place, time   Eye Contact Fair   Speech Regular rate, rhythm, volume and tone   Language Normal   Psychomotor Behavior Less agitated   Mood Remains anxious, somewhat depressed   Affect Remains dramatic   Thought Process Linear, [place]   Associations Intact   Thought Content Patient is currently negative for suicide ideation, negative for plan or intent, able to contract no self harm and identify barriers to suicide.  Negative for obsessions, compulsions or psychosis.      Fund of Knowledge Average   Insight Stagnant   Judgement Stagnant   Attention Span & Concentration Poor   Recent & Remote Memory Poor   Gait Normal          Labs   Labs reviewed.  No results found for this or any previous visit (from the past 24 hour(s)).       Impression     The patient is an " 85-year-old woman presenting hyperverbal, anxious, sleep deprived. She has been able to obtain sleep with Zyprexa and has been improving. She will likely discharge to assisted living in [place] or [place].     Diagnoses   1. Dementia with behavioral disturbance.  2.  Rule out bipolar disorder, type 2, most recent episode hypomanic.      Plan     1. Explained side effects, benefits, and complications of medications to the patient, Pt gave verbal consent.  2. Medication changes: None.  3. Discussed treatment plan with patient and team.  4. Projected length of stay: Until pt has been stabilized.  5. Pt's son to look into placement at [facility] and possible guardianship.  6. Coordinate with pt's daughter for alternative placement.    Attestation:   Patient has been seen and evaluated by me, Stefan Cartagena MD.    Patient ID:  Name: Vanessa Watson   MRN: [id number]  Admission: 1/6/2017   YOB: 1931

## 2017-01-23 NOTE — PLAN OF CARE
Problem: Depressive Symptoms  Goal: Depressive Symptoms  Signs and symptoms of listed problems will be absent or manageable.   Outcome: No Change  Pt has been present in the SDU.   Her affect is flat/blunt, but easily brightens upon approach.   Pt attended groups and participated appropriately.  Was noted by staff to be mumbling under her breath during focus group.   She is a very pleasant woman, but appears to get anxious about all the choices being made with her plan of care.   Pt continues to find ways to prove that she can still have an independent living lifestyle, and doesn't want to move to an assisted living.   Pt is med compliant and tolerating medication without side effects.

## 2017-01-23 NOTE — PROGRESS NOTES
"Pt attended wrap up group. She was very pleasant. She stated that she had a very productive day. She states that she was able to \"break down some barriers\" with her son and had a great conversation with her daughter.   "

## 2017-01-24 VITALS
TEMPERATURE: 97.5 F | OXYGEN SATURATION: 94 % | WEIGHT: 135.3 LBS | HEIGHT: 61 IN | DIASTOLIC BLOOD PRESSURE: 66 MMHG | HEART RATE: 75 BPM | SYSTOLIC BLOOD PRESSURE: 129 MMHG | RESPIRATION RATE: 16 BRPM | BODY MASS INDEX: 25.54 KG/M2

## 2017-01-24 PROCEDURE — A9270 NON-COVERED ITEM OR SERVICE: HCPCS | Mod: GY | Performed by: PSYCHIATRY & NEUROLOGY

## 2017-01-24 PROCEDURE — 25000132 ZZH RX MED GY IP 250 OP 250 PS 637: Mod: GY | Performed by: PSYCHIATRY & NEUROLOGY

## 2017-01-24 RX ORDER — OLANZAPINE 5 MG/1
5 TABLET ORAL AT BEDTIME
Qty: 30 TABLET | Refills: 0 | Status: SHIPPED | OUTPATIENT
Start: 2017-01-24

## 2017-01-24 RX ORDER — OLANZAPINE 2.5 MG/1
2.5 TABLET, FILM COATED ORAL EVERY MORNING
Qty: 30 TABLET | Refills: 0 | Status: SHIPPED | OUTPATIENT
Start: 2017-01-24

## 2017-01-24 RX ADMIN — ASPIRIN 81 MG 81 MG: 81 TABLET ORAL at 08:35

## 2017-01-24 RX ADMIN — Medication 1 TABLET: at 08:34

## 2017-01-24 RX ADMIN — VITAMIN D, TAB 1000IU (100/BT) 2000 UNITS: 25 TAB at 08:34

## 2017-01-24 RX ADMIN — OLANZAPINE 2.5 MG: 2.5 TABLET, FILM COATED ORAL at 08:34

## 2017-01-24 RX ADMIN — CYANOCOBALAMIN TAB 1000 MCG 1000 MCG: 1000 TAB at 08:34

## 2017-01-24 NOTE — PROGRESS NOTES
"[facility] Psychiatric Progress Note       Interim History   The patient's care was discussed with the treatment team and chart notes were reviewed. Pt seen on SDU. Tolerating medications without side effects. Side effects, risks, and benefits of medications reviewed with patient. Pt would like Dr. Cartagena to contact her therapist, Clementina Sahni. Pt to sign PETROS. Pt is more accepting of moving to [facility] in [place] and remains somewhat resistant to leaving her environment in [place]. Denies suicidal or homicidal ideation.     Pt's son called the station back today, he stated that he would be able to house pt until she is placed in [facility]. Pt to discharge today. She is looking forward to discharging. A letter will need to be written regarding the the termination of the lease of her current residence.     Medications     Current Facility-Administered Medications:    OLANZapine  2.5 mg Oral QAM     polyethylene glycol  17 g Oral Daily     simvastatin  40 mg Oral At Bedtime     OLANZapine  5 mg Oral At Bedtime     aspirin chewable tablet 81 mg  81 mg Oral Daily     calcium-vitamin D  1 tablet Oral Daily     cholecalciferol  2,000 Units Oral Daily     cyanocobalamin  1,000 mcg Oral Daily     PRNs:  pramox-pe-glycerin-petrolatum, acetaminophen      Allergies    No Known Allergies     Medical Review of Systems   /66 mmHg  Pulse 75  Temp(Src) 97.5  F (36.4  C) (Oral)  Resp 16  Ht 1.549 m (5' 1\")  Wt 61.372 kg (135 lb 4.8 oz)  BMI 25.58 kg/m2  SpO2 94%  Body mass index is 25.58 kg/(m^2).  A 10-point review of systems was performed by Dr. Cartagena and is negative, no new findings.      Psychiatric Examination     Appearance Sitting in chair, dressed in casual clothes. Appears stated age.   Attitude Cooperative, remains paranoid, forgetful, ruminative   Orientation Oriented to person, place, time   Eye Contact Fair   Speech Regular rate, rhythm, volume and tone "   Language Normal   Psychomotor Behavior Appropriate   Mood Less anxious   Affect Less dramatic   Thought Process Linear, [place]   Associations Intact   Thought Content Patient is currently negative for suicide ideation, negative for plan or intent, able to contract no self harm and identify barriers to suicide.  Negative for obsessions, compulsions or psychosis.      Fund of Knowledge Average   Insight Improving   Judgement Improving   Attention Span & Concentration Impaired   Recent & Remote Memory Poor   Gait Normal          Labs   Labs reviewed.  No results found for this or any previous visit (from the past 24 hour(s)).       Impression     The patient is an 85-year-old woman presenting hyperverbal, anxious, sleep deprived. She has been able to obtain sleep with Zyprexa and has been improving. She will likely discharge to assisted living in [place] or [place].     Diagnoses   1. Dementia with behavioral disturbance.  2.  Rule out bipolar disorder, type 2, most recent episode hypomanic.      Plan     1. Explained side effects, benefits, and complications of medications to the patient, Pt gave verbal consent.  2. Medication changes: None.  3. Discussed treatment plan with patient and team.  4. Projected length of stay: Pt to discharge today with son.    Attestation:   Patient has been seen and evaluated by me, Stefan Cartagena MD.    Patient ID:  Name: Vanessa Watson   MRN: [id number]  Admission: 1/6/2017   YOB: 1931

## 2017-01-24 NOTE — DISCHARGE INSTRUCTIONS
Behavioral Discharge Planning and Instructions    Summary:  Admitted due to anxiety and poor sleep    Main Diagnosis:   Dementia with behavioral disturbance; Rule out Bipolar Disorder, type 2, most recent episode hypomanic.     Major Treatments, Procedures and Findings:  Psychiatric assessment    Symptoms to Report: Losing more sleep, Mood getting worse or Thoughts of suicide    Lifestyle Adjustment: Follow all treatment recommendations. Develop and follow safety plan. Due to your dementia diagnosis,  you should no longer drive a vehicle.  Your son will take possession of your car keys so that you can no longer drive.     Psychiatry Follow-up:     Your son will set up a follow up an appointment for you with a psychiatrist closer to the [facility] living where you will be residing. In the meantime, until you have established care with a new psychiatrist, please contact your primary care physician for refills of your medications.     [facility]  Dr. Joel Eaton MD  [address] [place] [id number] [phone] / [phone] fax    Your current [facility]  is Gisela Weiner at [facility]. She can be reached at [phone]. Her fax number is [phone].     Your son is making arrangements for you to move to [facility].     [address] [address] [phone] / [phone] fax    Resources:   Crisis Intervention: 524.940.5352 or 852-077-0371 (TTY: 679.785.3067).  Call anytime for help.  National [place] on Mental Illness (www.mn.chato.org): 615.766.7930 or 374-807-2688.  National Suicide Prevention Line (www.mentalhealthmn.org): 673-189-KGHH (8278)  Mental Health Association of [place] (www.mentalhealth.org): 940.319.1441 or 360-266-7090    General Medication Instructions:   See your medication sheet(s) for instructions.   Take all medicines as directed.  Make no changes unless your doctor suggests them.    Go to all your doctor visits.  Be sure to have all your required lab tests. This way, your medicines can be refilled on time.  Do not use any drugs not prescribed by your doctor.  Avoid alcohol.

## 2017-01-24 NOTE — PLAN OF CARE
Problem: Goal Outcome Summary  Goal: Goal Outcome Summary  Outcome: No Change  Pt visible on the unit. Anxious and confused. Pt has a difficult time focusing at group and a hard time making decisions about day to day activities. Pt denies need for help. Denies suicidal ideations.

## 2017-01-24 NOTE — PROGRESS NOTES
Dc 'd to home with belongings, security envelope, medications, instructions and friend as .  Patient denies complaints. VUO discharge instructions. To follow up with PCP.

## 2017-01-24 NOTE — PROGRESS NOTES
Emailed medica card to son per patient request. Patient to be discharged today.Will discuss with patient as time comes closer to sons' arrival.

## 2017-01-27 NOTE — PLAN OF CARE
Problem: Discharge Planning  Goal: Discharge Planning (Adult, OB, Behavioral, Peds)  Attended process group on 1/23 and 1/24. Participation complete, though responses at times vague or tangential.

## 2017-02-07 NOTE — DISCHARGE SUMMARY
DATE OF ADMISSION:  01/06/2017      DATE OF DISCHARGE:  01/24/2017      IDENTIFICATION:  Ms. Vanessa Watson is an 85-year-old   female with history of depression and dementia.  The patient was admitted to the hospital depressed, feeling extremely anxious and overwhelmed.  Family reported that she was having more and more problems with functioning.  She is becoming much more isolated, withdrawn.  The patient was previously hospitalized at [facility] in 04/2016.  At that time, she had psychological testing by Dr. Julien Quigley who stated that she had anxiety, depression and some mild dementia and narcissistic, passive dependent traits.  At that point, the family was attempting to get her placed in an assisted living or senior rise.  The patient went through Jessica waiver and all kinds of assessments and had everything set up, but then she would not move.  She comes back this time to the hospital overwhelmed and not able to take care of herself.  She had an occupational therapy assessment.  That assessment showed decline since her initial evaluation last April at [facility] and she shows that she is not able to cook or take care of herself independently.  They recommended that she also stop driving.  The patient was admitted, was started on olanzapine 5 mg at night. 2.5 mg in the morning, that seemed to be the most help for her.  She had no side effects from medicines.  She seemed a lot calmer, way less depressed and down.  She had remarkably poor insight and was quite dramatic and wanting people to rescue her and she was quite grandiose about what resources she had.  Her daughter who is a dentist, also was quite involved splitting with the treatment team, calling mom and telling her to do all kinds of things that were just poor judgment.  The patient was then ready for discharge.  Plan was for her to be going to [facility] which had been set up by her son previously.  Initially,  she was quite resistant to this, but did eventually agree she would be staying at a friend of the family's until she got there.  She had a physical exam while she was on the medical station.  She was seen on consult by Dr. Rodriguez and then transferred to Psychiatry.  See history by Renaldo Acahrya, Internal Medicine.  No new medical issues were addressed.  She had labs while she was on the medical station.  See their dictation.       DISCHARGE PLAN:  The patient to be discharged to her family friend until she can get into the assisted living and [facility] case management involved in this placement and has been setting up services as necessary care she needs.  The patient was in agreement to do so.  She was then ready for discharge.      DISCHARGE MEDICATIONS:  Olanzapine 2.5 mg in the morning and 5 mg at night.      DISCHARGE FOLLOWUP:  Scheduled through Dr. Solis.         MARIA ROGERS MD             D: 2017 09:18   T: 2017 09:52   MT: MANJULA      Name:     CATINA CROWELL   MRN:      [id number]        Account:        [id number]   :      1931           Admit Date:                                       Discharge Date: 2017      Document: [id number]

## 2018-03-29 ENCOUNTER — RECORDS - HEALTHEAST (OUTPATIENT)
Dept: LAB | Facility: CLINIC | Age: 83
End: 2018-03-29

## 2018-03-29 LAB — TSH SERPL DL<=0.005 MIU/L-ACNC: 1.92 UIU/ML (ref 0.3–5)

## 2018-03-30 LAB — BACTERIA SPEC CULT: NORMAL

## 2019-07-11 ENCOUNTER — RECORDS - HEALTHEAST (OUTPATIENT)
Dept: LAB | Facility: CLINIC | Age: 84
End: 2019-07-11

## 2019-07-11 LAB
ALBUMIN UR-MCNC: NEGATIVE MG/DL
APPEARANCE UR: ABNORMAL
BACTERIA #/AREA URNS HPF: ABNORMAL HPF
BILIRUB UR QL STRIP: NEGATIVE
COLOR UR AUTO: YELLOW
GLUCOSE UR STRIP-MCNC: NEGATIVE MG/DL
HGB UR QL STRIP: NEGATIVE
KETONES UR STRIP-MCNC: NEGATIVE MG/DL
LEUKOCYTE ESTERASE UR QL STRIP: ABNORMAL
MUCOUS THREADS #/AREA URNS LPF: ABNORMAL LPF
NITRATE UR QL: NEGATIVE
PH UR STRIP: 5 [PH] (ref 4.5–8)
RBC #/AREA URNS AUTO: ABNORMAL HPF
SP GR UR STRIP: 1.02 (ref 1–1.03)
SQUAMOUS #/AREA URNS AUTO: ABNORMAL LPF
TRANS CELLS #/AREA URNS HPF: ABNORMAL LPF
UROBILINOGEN UR STRIP-ACNC: ABNORMAL
WBC #/AREA URNS AUTO: ABNORMAL HPF

## 2019-07-12 LAB — BACTERIA SPEC CULT: ABNORMAL

## 2019-09-18 ENCOUNTER — RECORDS - HEALTHEAST (OUTPATIENT)
Dept: LAB | Facility: CLINIC | Age: 84
End: 2019-09-18

## 2019-09-18 LAB
ALBUMIN UR-MCNC: NEGATIVE MG/DL
APPEARANCE UR: CLEAR
BACTERIA #/AREA URNS HPF: ABNORMAL HPF
BILIRUB UR QL STRIP: NEGATIVE
COLOR UR AUTO: YELLOW
GLUCOSE UR STRIP-MCNC: NEGATIVE MG/DL
HGB UR QL STRIP: NEGATIVE
KETONES UR STRIP-MCNC: NEGATIVE MG/DL
LEUKOCYTE ESTERASE UR QL STRIP: ABNORMAL
NITRATE UR QL: NEGATIVE
PH UR STRIP: 5.5 [PH] (ref 4.5–8)
RBC #/AREA URNS AUTO: ABNORMAL HPF
SP GR UR STRIP: 1.01 (ref 1–1.03)
SQUAMOUS #/AREA URNS AUTO: ABNORMAL LPF
UROBILINOGEN UR STRIP-ACNC: ABNORMAL
WBC #/AREA URNS AUTO: ABNORMAL HPF

## 2019-09-19 LAB — BACTERIA SPEC CULT: NO GROWTH

## 2019-10-10 ENCOUNTER — RECORDS - HEALTHEAST (OUTPATIENT)
Dept: LAB | Facility: CLINIC | Age: 84
End: 2019-10-10

## 2019-10-10 LAB
25(OH)D3 SERPL-MCNC: 23.4 NG/ML (ref 30–80)
ALBUMIN SERPL-MCNC: 3.8 G/DL (ref 3.5–5)
ALP SERPL-CCNC: 94 U/L (ref 45–120)
ALT SERPL W P-5'-P-CCNC: 39 U/L (ref 0–45)
ANION GAP SERPL CALCULATED.3IONS-SCNC: 11 MMOL/L (ref 5–18)
AST SERPL W P-5'-P-CCNC: 34 U/L (ref 0–40)
BILIRUB SERPL-MCNC: 0.7 MG/DL (ref 0–1)
BUN SERPL-MCNC: 18 MG/DL (ref 8–28)
CALCIUM SERPL-MCNC: 9.6 MG/DL (ref 8.5–10.5)
CHLORIDE BLD-SCNC: 106 MMOL/L (ref 98–107)
CHOLEST SERPL-MCNC: 251 MG/DL
CO2 SERPL-SCNC: 23 MMOL/L (ref 22–31)
CREAT SERPL-MCNC: 0.81 MG/DL (ref 0.6–1.1)
ERYTHROCYTE [DISTWIDTH] IN BLOOD BY AUTOMATED COUNT: 14.2 % (ref 11–14.5)
FASTING STATUS PATIENT QL REPORTED: ABNORMAL
GFR SERPL CREATININE-BSD FRML MDRD: >60 ML/MIN/1.73M2
GLUCOSE BLD-MCNC: 130 MG/DL (ref 70–125)
HCT VFR BLD AUTO: 42.6 % (ref 35–47)
HDLC SERPL-MCNC: 59 MG/DL
HGB BLD-MCNC: 13.7 G/DL (ref 12–16)
LDLC SERPL CALC-MCNC: 166 MG/DL
MCH RBC QN AUTO: 30.4 PG (ref 27–34)
MCHC RBC AUTO-ENTMCNC: 32.2 G/DL (ref 32–36)
MCV RBC AUTO: 95 FL (ref 80–100)
PLATELET # BLD AUTO: 223 THOU/UL (ref 140–440)
PMV BLD AUTO: 12.1 FL (ref 8.5–12.5)
POTASSIUM BLD-SCNC: 3.8 MMOL/L (ref 3.5–5)
PROT SERPL-MCNC: 7.1 G/DL (ref 6–8)
RBC # BLD AUTO: 4.51 MILL/UL (ref 3.8–5.4)
SODIUM SERPL-SCNC: 140 MMOL/L (ref 136–145)
TRIGL SERPL-MCNC: 130 MG/DL
TSH SERPL DL<=0.005 MIU/L-ACNC: 3.41 UIU/ML (ref 0.3–5)
VIT B12 SERPL-MCNC: 392 PG/ML (ref 213–816)
WBC: 6.5 THOU/UL (ref 4–11)

## 2020-01-01 ENCOUNTER — RECORDS - HEALTHEAST (OUTPATIENT)
Dept: LAB | Facility: CLINIC | Age: 85
End: 2020-01-01

## 2020-01-01 LAB
ALBUMIN UR-MCNC: NEGATIVE MG/DL
APPEARANCE UR: ABNORMAL
BACTERIA #/AREA URNS HPF: ABNORMAL HPF
BACTERIA SPEC CULT: NORMAL
BILIRUB UR QL STRIP: NEGATIVE
CAOX CRY #/AREA URNS HPF: PRESENT /[HPF]
COLOR UR AUTO: YELLOW
GLUCOSE UR STRIP-MCNC: NEGATIVE MG/DL
HGB UR QL STRIP: NEGATIVE
KETONES UR STRIP-MCNC: NEGATIVE MG/DL
LEUKOCYTE ESTERASE UR QL STRIP: ABNORMAL
MUCOUS THREADS #/AREA URNS LPF: ABNORMAL LPF
NITRATE UR QL: NEGATIVE
PH UR STRIP: 5.5 [PH] (ref 4.5–8)
RBC #/AREA URNS AUTO: ABNORMAL HPF
SP GR UR STRIP: 1.02 (ref 1–1.03)
SQUAMOUS #/AREA URNS AUTO: ABNORMAL LPF
TRANS CELLS #/AREA URNS HPF: ABNORMAL LPF
UROBILINOGEN UR STRIP-ACNC: ABNORMAL
WBC #/AREA URNS AUTO: ABNORMAL HPF

## 2020-01-07 ENCOUNTER — RECORDS - HEALTHEAST (OUTPATIENT)
Dept: LAB | Facility: CLINIC | Age: 85
End: 2020-01-07

## 2020-01-07 LAB
ALBUMIN UR-MCNC: ABNORMAL MG/DL
APPEARANCE UR: ABNORMAL
BACTERIA #/AREA URNS HPF: ABNORMAL HPF
BILIRUB UR QL STRIP: NEGATIVE
CAOX CRY #/AREA URNS HPF: PRESENT /[HPF]
COLOR UR AUTO: YELLOW
GLUCOSE UR STRIP-MCNC: NEGATIVE MG/DL
HGB UR QL STRIP: NEGATIVE
KETONES UR STRIP-MCNC: NEGATIVE MG/DL
LEUKOCYTE ESTERASE UR QL STRIP: ABNORMAL
MUCOUS THREADS #/AREA URNS LPF: ABNORMAL LPF
NITRATE UR QL: NEGATIVE
PH UR STRIP: 5.5 [PH] (ref 4.5–8)
RBC #/AREA URNS AUTO: ABNORMAL HPF
SP GR UR STRIP: 1.02 (ref 1–1.03)
SQUAMOUS #/AREA URNS AUTO: ABNORMAL LPF
TRANS CELLS #/AREA URNS HPF: ABNORMAL LPF
UROBILINOGEN UR STRIP-ACNC: ABNORMAL
WBC #/AREA URNS AUTO: ABNORMAL HPF

## 2020-01-08 LAB — BACTERIA SPEC CULT: NO GROWTH

## 2020-01-29 ENCOUNTER — RECORDS - HEALTHEAST (OUTPATIENT)
Dept: LAB | Facility: CLINIC | Age: 85
End: 2020-01-29

## 2020-01-29 LAB
CHOLEST SERPL-MCNC: 253 MG/DL
FASTING STATUS PATIENT QL REPORTED: ABNORMAL
HDLC SERPL-MCNC: 50 MG/DL
LDLC SERPL CALC-MCNC: 170 MG/DL
TRIGL SERPL-MCNC: 163 MG/DL

## 2020-02-26 ENCOUNTER — RECORDS - HEALTHEAST (OUTPATIENT)
Dept: LAB | Facility: CLINIC | Age: 85
End: 2020-02-26

## 2020-02-26 LAB
ALBUMIN UR-MCNC: NEGATIVE MG/DL
APPEARANCE UR: CLEAR
BACTERIA #/AREA URNS HPF: ABNORMAL HPF
BILIRUB UR QL STRIP: NEGATIVE
COLOR UR AUTO: ABNORMAL
GLUCOSE UR STRIP-MCNC: NEGATIVE MG/DL
HGB UR QL STRIP: NEGATIVE
KETONES UR STRIP-MCNC: NEGATIVE MG/DL
LEUKOCYTE ESTERASE UR QL STRIP: ABNORMAL
NITRATE UR QL: NEGATIVE
PH UR STRIP: 5 [PH] (ref 4.5–8)
RBC #/AREA URNS AUTO: ABNORMAL HPF
SP GR UR STRIP: 1.01 (ref 1–1.03)
SQUAMOUS #/AREA URNS AUTO: ABNORMAL LPF
UROBILINOGEN UR STRIP-ACNC: ABNORMAL
WBC #/AREA URNS AUTO: ABNORMAL HPF

## 2020-02-27 LAB — BACTERIA SPEC CULT: NORMAL

## 2020-05-26 ENCOUNTER — RECORDS - HEALTHEAST (OUTPATIENT)
Dept: LAB | Facility: CLINIC | Age: 85
End: 2020-05-26

## 2020-05-26 LAB
ALBUMIN UR-MCNC: ABNORMAL MG/DL
APPEARANCE UR: ABNORMAL
BACTERIA #/AREA URNS HPF: ABNORMAL HPF
BILIRUB UR QL STRIP: NEGATIVE
CAOX CRY #/AREA URNS HPF: PRESENT /[HPF]
COLOR UR AUTO: YELLOW
GLUCOSE UR STRIP-MCNC: NEGATIVE MG/DL
HGB UR QL STRIP: ABNORMAL
KETONES UR STRIP-MCNC: ABNORMAL MG/DL
LEUKOCYTE ESTERASE UR QL STRIP: ABNORMAL
MUCOUS THREADS #/AREA URNS LPF: ABNORMAL LPF
NITRATE UR QL: NEGATIVE
PH UR STRIP: 5.5 [PH] (ref 4.5–8)
RBC #/AREA URNS AUTO: ABNORMAL HPF
SP GR UR STRIP: 1.02 (ref 1–1.03)
SQUAMOUS #/AREA URNS AUTO: ABNORMAL LPF
TRANS CELLS #/AREA URNS HPF: ABNORMAL LPF
UROBILINOGEN UR STRIP-ACNC: ABNORMAL
WBC #/AREA URNS AUTO: ABNORMAL HPF

## 2020-05-27 LAB — BACTERIA SPEC CULT: NORMAL

## 2020-07-09 ENCOUNTER — RECORDS - HEALTHEAST (OUTPATIENT)
Dept: LAB | Facility: CLINIC | Age: 85
End: 2020-07-09

## 2020-07-09 LAB
ALBUMIN UR-MCNC: NEGATIVE MG/DL
APPEARANCE UR: CLEAR
BACTERIA #/AREA URNS HPF: ABNORMAL HPF
BILIRUB UR QL STRIP: NEGATIVE
COLOR UR AUTO: YELLOW
GLUCOSE UR STRIP-MCNC: NEGATIVE MG/DL
HGB UR QL STRIP: ABNORMAL
KETONES UR STRIP-MCNC: NEGATIVE MG/DL
LEUKOCYTE ESTERASE UR QL STRIP: ABNORMAL
NITRATE UR QL: NEGATIVE
PH UR STRIP: 5 [PH] (ref 4.5–8)
RBC #/AREA URNS AUTO: ABNORMAL HPF
SP GR UR STRIP: 1.02 (ref 1–1.03)
SQUAMOUS #/AREA URNS AUTO: ABNORMAL LPF
TRANS CELLS #/AREA URNS HPF: ABNORMAL LPF
UROBILINOGEN UR STRIP-ACNC: ABNORMAL
WBC #/AREA URNS AUTO: ABNORMAL HPF

## 2020-07-10 LAB — BACTERIA SPEC CULT: NORMAL

## 2020-08-20 ENCOUNTER — RECORDS - HEALTHEAST (OUTPATIENT)
Dept: LAB | Facility: CLINIC | Age: 85
End: 2020-08-20

## 2020-08-20 LAB
ALBUMIN UR-MCNC: ABNORMAL MG/DL
APPEARANCE UR: ABNORMAL
BACTERIA #/AREA URNS HPF: ABNORMAL HPF
BILIRUB UR QL STRIP: NEGATIVE
CAOX CRY #/AREA URNS HPF: PRESENT /[HPF]
COLOR UR AUTO: YELLOW
GLUCOSE UR STRIP-MCNC: NEGATIVE MG/DL
HGB UR QL STRIP: NEGATIVE
KETONES UR STRIP-MCNC: NEGATIVE MG/DL
LEUKOCYTE ESTERASE UR QL STRIP: ABNORMAL
MUCOUS THREADS #/AREA URNS LPF: ABNORMAL LPF
NITRATE UR QL: NEGATIVE
PH UR STRIP: 5.5 [PH] (ref 4.5–8)
RBC #/AREA URNS AUTO: ABNORMAL HPF
SP GR UR STRIP: 1.03 (ref 1–1.03)
SQUAMOUS #/AREA URNS AUTO: ABNORMAL LPF
TRANS CELLS #/AREA URNS HPF: ABNORMAL LPF
UROBILINOGEN UR STRIP-ACNC: ABNORMAL
WBC #/AREA URNS AUTO: ABNORMAL HPF

## 2020-08-21 LAB — BACTERIA SPEC CULT: NO GROWTH

## 2020-08-25 ENCOUNTER — RECORDS - HEALTHEAST (OUTPATIENT)
Dept: LAB | Facility: CLINIC | Age: 85
End: 2020-08-25

## 2020-08-26 LAB
ANION GAP SERPL CALCULATED.3IONS-SCNC: 9 MMOL/L (ref 5–18)
BUN SERPL-MCNC: 15 MG/DL (ref 8–28)
CALCIUM SERPL-MCNC: 9.1 MG/DL (ref 8.5–10.5)
CHLORIDE BLD-SCNC: 107 MMOL/L (ref 98–107)
CO2 SERPL-SCNC: 24 MMOL/L (ref 22–31)
CREAT SERPL-MCNC: 0.72 MG/DL (ref 0.6–1.1)
GFR SERPL CREATININE-BSD FRML MDRD: >60 ML/MIN/1.73M2
GLUCOSE BLD-MCNC: 81 MG/DL (ref 70–125)
HGB BLD-MCNC: 13.2 G/DL (ref 12–16)
POTASSIUM BLD-SCNC: 4.4 MMOL/L (ref 3.5–5)
SODIUM SERPL-SCNC: 140 MMOL/L (ref 136–145)

## 2020-08-27 LAB — 25(OH)D3 SERPL-MCNC: 34 NG/ML (ref 30–80)

## 2021-01-01 ENCOUNTER — LAB REQUISITION (OUTPATIENT)
Dept: LAB | Facility: CLINIC | Age: 86
End: 2021-01-01
Payer: MEDICARE

## 2021-01-01 ENCOUNTER — RECORDS - HEALTHEAST (OUTPATIENT)
Dept: LAB | Facility: CLINIC | Age: 86
End: 2021-01-01

## 2021-01-01 ENCOUNTER — HOSPITAL ENCOUNTER (EMERGENCY)
Facility: HOSPITAL | Age: 86
End: 2021-11-18
Attending: EMERGENCY MEDICINE | Admitting: EMERGENCY MEDICINE
Payer: MEDICARE

## 2021-01-01 DIAGNOSIS — K21.9 GASTRO-ESOPHAGEAL REFLUX DISEASE WITHOUT ESOPHAGITIS: ICD-10-CM

## 2021-01-01 DIAGNOSIS — E55.9 VITAMIN D DEFICIENCY, UNSPECIFIED: ICD-10-CM

## 2021-01-01 DIAGNOSIS — I46.9 CARDIAC ARREST (H): ICD-10-CM

## 2021-01-01 DIAGNOSIS — I10 ESSENTIAL (PRIMARY) HYPERTENSION: ICD-10-CM
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
## 2021-01-01 LAB
25(OH)D3 SERPL-MCNC: 31.7 NG/ML (ref 30–80)
ALBUMIN SERPL-MCNC: 3.7 G/DL (ref 3.5–5)
ALBUMIN SERPL-MCNC: 3.8 G/DL (ref 3.5–5)
ALBUMIN SERPL-MCNC: 3.8 G/DL (ref 3.5–5)
ALP SERPL-CCNC: 86 U/L (ref 45–120)
ALP SERPL-CCNC: 90 U/L (ref 45–120)
ALP SERPL-CCNC: 92 U/L (ref 45–120)
ALT SERPL W P-5'-P-CCNC: 23 U/L (ref 0–45)
ALT SERPL W P-5'-P-CCNC: 24 U/L (ref 0–45)
ALT SERPL W P-5'-P-CCNC: 32 U/L (ref 0–45)
ANION GAP SERPL CALCULATED.3IONS-SCNC: 14 MMOL/L (ref 5–18)
ANION GAP SERPL CALCULATED.3IONS-SCNC: 14 MMOL/L (ref 5–18)
ANION GAP SERPL CALCULATED.3IONS-SCNC: 9 MMOL/L (ref 5–18)
AST SERPL W P-5'-P-CCNC: 25 U/L (ref 0–40)
AST SERPL W P-5'-P-CCNC: 26 U/L (ref 0–40)
AST SERPL W P-5'-P-CCNC: 37 U/L (ref 0–40)
BACTERIA SPEC CULT: NORMAL
BASOPHILS # BLD AUTO: 0 10E3/UL (ref 0–0.2)
BASOPHILS # BLD AUTO: 0 THOU/UL (ref 0–0.2)
BASOPHILS # BLD AUTO: 0 THOU/UL (ref 0–0.2)
BASOPHILS NFR BLD AUTO: 0 % (ref 0–2)
BASOPHILS NFR BLD AUTO: 0 % (ref 0–2)
BASOPHILS NFR BLD AUTO: 1 %
BILIRUB SERPL-MCNC: 0.7 MG/DL (ref 0–1)
BILIRUB SERPL-MCNC: 0.8 MG/DL (ref 0–1)
BILIRUB SERPL-MCNC: 0.9 MG/DL (ref 0–1)
BUN SERPL-MCNC: 17 MG/DL (ref 8–28)
BUN SERPL-MCNC: 19 MG/DL (ref 8–28)
BUN SERPL-MCNC: 19 MG/DL (ref 8–28)
CALCIUM SERPL-MCNC: 9.2 MG/DL (ref 8.5–10.5)
CALCIUM SERPL-MCNC: 9.6 MG/DL (ref 8.5–10.5)
CALCIUM SERPL-MCNC: 9.7 MG/DL (ref 8.5–10.5)
CHLORIDE BLD-SCNC: 106 MMOL/L (ref 98–107)
CHLORIDE BLD-SCNC: 107 MMOL/L (ref 98–107)
CHLORIDE BLD-SCNC: 107 MMOL/L (ref 98–107)
CO2 SERPL-SCNC: 20 MMOL/L (ref 22–31)
CO2 SERPL-SCNC: 20 MMOL/L (ref 22–31)
CO2 SERPL-SCNC: 25 MMOL/L (ref 22–31)
CREAT SERPL-MCNC: 0.77 MG/DL (ref 0.6–1.1)
CREAT SERPL-MCNC: 0.78 MG/DL (ref 0.6–1.1)
CREAT SERPL-MCNC: 0.8 MG/DL (ref 0.6–1.1)
DEPRECATED CALCIDIOL+CALCIFEROL SERPL-MC: 40 UG/L (ref 30–80)
EOSINOPHIL # BLD AUTO: 0 THOU/UL (ref 0–0.4)
EOSINOPHIL # BLD AUTO: 0.1 10E3/UL (ref 0–0.7)
EOSINOPHIL # BLD AUTO: 0.1 THOU/UL (ref 0–0.4)
EOSINOPHIL NFR BLD AUTO: 0 % (ref 0–6)
EOSINOPHIL NFR BLD AUTO: 1 % (ref 0–6)
EOSINOPHIL NFR BLD AUTO: 2 %
ERYTHROCYTE [DISTWIDTH] IN BLOOD BY AUTOMATED COUNT: 14.6 % (ref 10–15)
ERYTHROCYTE [DISTWIDTH] IN BLOOD BY AUTOMATED COUNT: 14.6 % (ref 11–14.5)
ERYTHROCYTE [DISTWIDTH] IN BLOOD BY AUTOMATED COUNT: 14.6 % (ref 11–14.5)
GFR SERPL CREATININE-BSD FRML MDRD: 65 ML/MIN/1.73M2
GFR SERPL CREATININE-BSD FRML MDRD: >60 ML/MIN/1.73M2
GFR SERPL CREATININE-BSD FRML MDRD: >60 ML/MIN/1.73M2
GLUCOSE BLD-MCNC: 117 MG/DL (ref 70–125)
GLUCOSE BLD-MCNC: 118 MG/DL (ref 70–125)
GLUCOSE BLD-MCNC: 99 MG/DL (ref 70–125)
HCT VFR BLD AUTO: 39.9 % (ref 35–47)
HCT VFR BLD AUTO: 40.2 % (ref 35–47)
HCT VFR BLD AUTO: 42.9 % (ref 35–47)
HGB BLD-MCNC: 13 G/DL (ref 12–16)
HGB BLD-MCNC: 13 G/DL (ref 12–16)
HGB BLD-MCNC: 13.7 G/DL (ref 11.7–15.7)
IMM GRANULOCYTES # BLD: 0 10E3/UL
IMM GRANULOCYTES # BLD: 0 THOU/UL
IMM GRANULOCYTES # BLD: 0 THOU/UL
IMM GRANULOCYTES NFR BLD: 0 %
LYMPHOCYTES # BLD AUTO: 2.3 THOU/UL (ref 0.8–4.4)
LYMPHOCYTES # BLD AUTO: 2.4 10E3/UL (ref 0.8–5.3)
LYMPHOCYTES # BLD AUTO: 3.1 THOU/UL (ref 0.8–4.4)
LYMPHOCYTES NFR BLD AUTO: 32 %
LYMPHOCYTES NFR BLD AUTO: 32 % (ref 20–40)
LYMPHOCYTES NFR BLD AUTO: 33 % (ref 20–40)
MAGNESIUM SERPL-MCNC: 2 MG/DL (ref 1.8–2.6)
MAGNESIUM SERPL-MCNC: 2.1 MG/DL (ref 1.8–2.6)
MCH RBC QN AUTO: 29.5 PG (ref 26.5–33)
MCH RBC QN AUTO: 30.2 PG (ref 27–34)
MCH RBC QN AUTO: 30.6 PG (ref 27–34)
MCHC RBC AUTO-ENTMCNC: 31.9 G/DL (ref 31.5–36.5)
MCHC RBC AUTO-ENTMCNC: 32.3 G/DL (ref 32–36)
MCHC RBC AUTO-ENTMCNC: 32.6 G/DL (ref 32–36)
MCV RBC AUTO: 92 FL (ref 78–100)
MCV RBC AUTO: 94 FL (ref 80–100)
MCV RBC AUTO: 94 FL (ref 80–100)
MONOCYTES # BLD AUTO: 0.6 10E3/UL (ref 0–1.3)
MONOCYTES # BLD AUTO: 0.7 THOU/UL (ref 0–0.9)
MONOCYTES # BLD AUTO: 0.8 THOU/UL (ref 0–0.9)
MONOCYTES NFR BLD AUTO: 8 %
MONOCYTES NFR BLD AUTO: 8 % (ref 2–10)
MONOCYTES NFR BLD AUTO: 9 % (ref 2–10)
NEUTROPHILS # BLD AUTO: 4.1 THOU/UL (ref 2–7.7)
NEUTROPHILS # BLD AUTO: 4.3 10E3/UL (ref 1.6–8.3)
NEUTROPHILS # BLD AUTO: 5.4 THOU/UL (ref 2–7.7)
NEUTROPHILS NFR BLD AUTO: 57 %
NEUTROPHILS NFR BLD AUTO: 57 % (ref 50–70)
NEUTROPHILS NFR BLD AUTO: 58 % (ref 50–70)
NRBC # BLD AUTO: 0 10E3/UL
NRBC BLD AUTO-RTO: 0 /100
PLATELET # BLD AUTO: 191 THOU/UL (ref 140–440)
PLATELET # BLD AUTO: 205 10E3/UL (ref 150–450)
PLATELET # BLD AUTO: 212 THOU/UL (ref 140–440)
PMV BLD AUTO: 12.6 FL (ref 8.5–12.5)
PMV BLD AUTO: 12.8 FL (ref 8.5–12.5)
POTASSIUM BLD-SCNC: 3.8 MMOL/L (ref 3.5–5)
POTASSIUM BLD-SCNC: 4.3 MMOL/L (ref 3.5–5)
POTASSIUM BLD-SCNC: 4.3 MMOL/L (ref 3.5–5)
PROT SERPL-MCNC: 6.8 G/DL (ref 6–8)
PROT SERPL-MCNC: 7.1 G/DL (ref 6–8)
PROT SERPL-MCNC: 7.4 G/DL (ref 6–8)
RBC # BLD AUTO: 4.25 MILL/UL (ref 3.8–5.4)
RBC # BLD AUTO: 4.3 MILL/UL (ref 3.8–5.4)
RBC # BLD AUTO: 4.65 10E6/UL (ref 3.8–5.2)
SODIUM SERPL-SCNC: 140 MMOL/L (ref 136–145)
SODIUM SERPL-SCNC: 141 MMOL/L (ref 136–145)
SODIUM SERPL-SCNC: 141 MMOL/L (ref 136–145)
WBC # BLD AUTO: 7.5 10E3/UL (ref 4–11)
WBC: 7.1 THOU/UL (ref 4–11)
WBC: 9.5 THOU/UL (ref 4–11)

## 2021-01-01 PROCEDURE — 99285 EMERGENCY DEPT VISIT HI MDM: CPT | Mod: 25

## 2021-01-01 PROCEDURE — 82306 VITAMIN D 25 HYDROXY: CPT | Mod: ORL | Performed by: INTERNAL MEDICINE

## 2021-01-01 PROCEDURE — 85025 COMPLETE CBC W/AUTO DIFF WBC: CPT | Mod: ORL | Performed by: INTERNAL MEDICINE

## 2021-01-01 PROCEDURE — 36415 COLL VENOUS BLD VENIPUNCTURE: CPT | Mod: ORL | Performed by: INTERNAL MEDICINE

## 2021-01-01 PROCEDURE — 999N000055 HC STATISTIC END TITIAL CO2 MONITORING

## 2021-01-01 PROCEDURE — 250N000011 HC RX IP 250 OP 636: Performed by: EMERGENCY MEDICINE

## 2021-01-01 PROCEDURE — 250N000009 HC RX 250: Performed by: EMERGENCY MEDICINE

## 2021-01-01 PROCEDURE — 80053 COMPREHEN METABOLIC PANEL: CPT | Mod: ORL | Performed by: INTERNAL MEDICINE

## 2021-01-01 PROCEDURE — 999N000157 HC STATISTIC RCP TIME EA 10 MIN

## 2021-01-01 PROCEDURE — 83735 ASSAY OF MAGNESIUM: CPT | Mod: ORL | Performed by: INTERNAL MEDICINE

## 2021-01-01 PROCEDURE — P9603 ONE-WAY ALLOW PRORATED MILES: HCPCS | Mod: ORL | Performed by: INTERNAL MEDICINE

## 2021-01-01 PROCEDURE — 999N000026 HC STATISTIC CARDIOPULM RESUSCITATION

## 2021-01-01 PROCEDURE — 92950 HEART/LUNG RESUSCITATION CPR: CPT

## 2021-01-01 PROCEDURE — P9604 ONE-WAY ALLOW PRORATED TRIP: HCPCS | Mod: ORL | Performed by: INTERNAL MEDICINE

## 2021-01-01 RX ORDER — EPINEPHRINE 1 MG/ML
1 INJECTION, SOLUTION INTRAMUSCULAR; SUBCUTANEOUS ONCE
Status: COMPLETED | OUTPATIENT
Start: 2021-01-01 | End: 2021-01-01

## 2021-01-01 RX ADMIN — SODIUM BICARBONATE 50 MEQ: 84 INJECTION, SOLUTION INTRAVENOUS at 08:38

## 2021-01-01 RX ADMIN — EPINEPHRINE 1 MG: 1 INJECTION INTRAMUSCULAR; INTRAVENOUS; SUBCUTANEOUS at 08:38

## 2021-01-01 RX ADMIN — EPINEPHRINE 1 MG: 1 INJECTION INTRAMUSCULAR; INTRAVENOUS; SUBCUTANEOUS at 08:42

## 2021-11-18 NOTE — PROGRESS NOTES
0835 set up and assist with CP arrest, bag/igel 1.0 FiO2, patient placed on [facility] chest compression unit on  arrival by RN. EtCO2 started measuring 38-41 with CPR. Suctioned through airway moderate amount bloody secretions. No apparent spontaneous respirations with possible agonal breathing, pulse check performed x 2 without palpable pulse. CPR performed without interruption [id number] until MD called code.

## 2021-11-18 NOTE — ED NOTES
Arrival to room with CPR with Reed and respiratory support with ambu bag to I-gell tube. Stopped resusitation for rhythm check. V fib. CPR resumed.

## 2021-11-18 NOTE — ED TRIAGE NOTES
Patient presents via [facility], crew #[id number], while in full cardiac and respiratory arrest. She is intubated by medics with a I-Gell and airway is now managed by RT. Patient lives in a memory care center and became ill with nausea while on the toilet. She was awake upon paramedic arrival, but decompensated to unresponsiveness. Initial cardiac rhythm was reported as PEA and she arrived with cardiac support with a LISA applying compressions. She is a full code.

## 2021-11-18 NOTE — ED PROVIDER NOTES
EMERGENCY DEPARTMENT ENCOUNTER      NAME: Vanessa Watson  AGE: 89 year old female  YOB: 1931  MRN: [id number]  EVALUATION DATE & TIME: 2021  8:37 AM    PCP: Renaldo Orozco    ED PROVIDER: Fredy Morgan M.D.      Chief Complaint   Patient presents with     Cardiac Arrest         FINAL IMPRESSION:  1.  Acute fall.  2.  Acute cardiac arrest.      ED COURSE & MEDICAL DECISION MAKIN:36 AM I met with the patient to gather history and to perform my initial exam. We discussed plans for the ED course, including diagnostic testing and treatment. History gathered from EMS. PPE worn: cloth mask, gloves.  Ongoing compressions and bagging patient with a patent airway.  Multiple blood about the face from her fall.  Initial rhythm pulseless electrical activity with no pulse.  Rhythm deteriorated to asystole with still no pulse despite multiple courses of epinephrine and bicarbonate.  8:46 AM Pulse check, patient has no pulse. Time of death called.  I have asked the unit coordinator to page the attending physician for the nursing home.  9:12 AM.  I spoke with Dr. Lopez, the primary attending and she is aware of the patient death.      Pertinent Labs & Imaging studies reviewed. (See chart for details)  89 year old female presents to the Emergency Department for evaluation of cardiac arrest after a fall.    At the conclusion of the encounter I discussed the results of all of the tests and the disposition. The questions were answered. The patient or family acknowledged understanding and was agreeable with the care plan.              minutes of critical care time: 15 minutes.    MEDICATIONS GIVEN IN THE EMERGENCY:  Medications - No data to display    NEW PRESCRIPTIONS STARTED AT TODAY'S ER VISIT  New Prescriptions    No medications on file          =================================================================    HPI    Patient information was obtained from: EMS     Use of : N/A      History limited secondary to patient critical condition      Vanessa Watson is a 89 year old female with a pertinent history of memory problem who presents to this ED by EMS for evaluation of cardiac arrest.     Per EMS: patient lives in memory care where they were called because the patient fell face first off the toilet and was in a pool of blood. When they got the patient into the ambulance, she started to decline and went into PEA. Patient had 2 rounds of epinephrine on the ambulance. Her BS was 211. She is a full code.     REVIEW OF SYSTEMS   Review of Systems   Unable to perform ROS: Acuity of condition      PAST MEDICAL HISTORY:  Past Medical History:   Diagnosis Date     Anxiety      High cholesterol      Osteopenia      Spastic colon        PAST SURGICAL HISTORY:  Past Surgical History:   Procedure Laterality Date     HIP SURGERY       ORTHOPEDIC SURGERY             CURRENT MEDICATIONS:    acetaminophen (TYLENOL) 500 MG tablet  Alendronate Sodium 70 MG TBEF  ASPIRIN PO  calcium-vitamin D (CALTRATE) 600-400 MG-UNIT per tablet  cholecalciferol 2000 UNITS CAPS  cyanocobalamin (VITAMIN B-12 ER) 1000 MCG TBCR  OLANZapine (ZYPREXA) 2.5 MG tablet  OLANZapine (ZYPREXA) 5 MG tablet  SIMVASTATIN PO        ALLERGIES:  No Known Allergies    FAMILY HISTORY:  No family history on file.    SOCIAL HISTORY:   Social History     Socioeconomic History     Marital status: Single     Spouse name: Not on file     Number of children: Not on file     Years of education: Not on file     Highest education level: Not on file   Occupational History     Not on file   Tobacco Use     Smoking status: Not on file     Smokeless tobacco: Not on file   Substance and Sexual Activity     Alcohol use: Yes     Alcohol/week: 5.8 standard drinks     Types: 7 Standard drinks or equivalent per week     Drug use: No     Sexual activity: Not on file   Other Topics Concern     Not on file   Social History Narrative     Not on file     Social  Determinants of Health     Financial Resource Strain: Not on file   Food Insecurity: Not on file   Transportation Needs: Not on file   Physical Activity: Not on file   Stress: Not on file   Social Connections: Not on file   Intimate Partner Violence: Not on file   Housing Stability: Not on file       VITALS:  There were no vitals taken for this visit.    PHYSICAL EXAM    Vital Signs: No spontaneous rhythm, pulse or blood pressure.  With bagging the patient, breath sounds are equal bilaterally.  Cardiac without rhythm or pulse or blood pressure.  Femoral pulse noted with compressions but none with out.  Blood about the face.  Pupils equal.       LAB:  All pertinent labs reviewed and interpreted.  Labs Ordered and Resulted from Time of ED Arrival to Time of ED Departure - No data to display    RADIOLOGY:  Reviewed all pertinent imaging. Please see official radiology report.  No orders to display              EKG:    Initial monitor rhythm showed pulseless electrical activity with no physical pulse or blood pressure.  This deteriorated to asystole again without pulse or blood pressure.    PROCEDURES:         I, Bogdan Harris, am serving as a scribe to document services personally performed by Dr. Morgan based on my observation and the provider's statements to me. I, Fredy Morgan MD attest that Bogdan Harris is acting in a scribe capacity, has observed my performance of the services and has documented them in accordance with my direction.    Fredy Morgan M.D.  Emergency Medicine  [facility] EMERGENCY DEPARTMENT  [address] [place] [id number]  [phone]  Dept: [phone]     Fredy Morgan MD  11/18/21 0904       Fredy Morgan MD  11/18/21 0913

## 2023-07-18 NOTE — PROGRESS NOTES
"[facility] Psychiatric Progress Note       Interim History   The patient's care was discussed with the treatment team and chart notes were reviewed. Pt seen on SDU. Tolerating medications without side effects. Side effects, risks, and benefits of medications reviewed with patient. Family meeting held at 0900 with pt's alternative care , (Gisela), therapist (Adrian), pt's son (Berhane), MD and hospital . Informed pt's son that pt has declined in ADL functioning from April to today. Pt's  informed her that she could transferred to [facility]. Pt's son stated that he has a place near his residence in [place] that would take her. Pt claimed that \"I have a whole sphere of people here, a Religion, and I don't want to leave, I'd like to be here because I've built a large community of people.\" However pt's son believes that she has been isolating herself despite all of community she claims she has. Pt's son states that he has many of the same resources near him that would be a mere five minutes away rather than one hour away from her. Pt is quite upset and insisting that her mental impairment is only temporary. Pt's son stated that they had spent two months looking for placement previously even though she had never liked her placement. He stated that pt had planned to arrive at his house on Christmas at 1730, however she did not show up as her daughter stated that she should rest at her house. Dr. Cartagena informed her that pt's daughter is not here at the meeting as she had not returned messages despite many calls from the hospital. Pt's daughter called pt over the weekend and stated that she \"was a terrible mother and  Pt's therapist stated that she has been decreasing her frequency of attending Religion, going to the [facility], and attending Al-Anon meetings, confirming that she has been isolating.  and therapist are in agreement that pt will need to be in " "assisted living and attempting to convince her that she will need it. Pt claims that she will be heartbroken by \"The emptiness of two children that don't love me.\" Pt's son has been receiving many calls from the police and other entities that she has not been safe and has become quite concerned about her. Pt's  stated she has been attempting nurse visits and therapist appointments but it has not been effective. Pt stated she wants a place that will fit her piano and her cats. Pt's therapist that she has not been taking her medications regularly despite her nurse placing her medications in a box and she has not been eating regularly, \"I'm lightheaded, I haven't been eating.\" Pt claims that she been eating $5 burgers during happy hour at [facility] and that would be enough. Pt believes that her cognitive decline is due to her progressive hearing loss. Dr. Cartagena again suggested that she obtain hearing aids. Pt's son will look into placement at assisted living at [facility].      Medications     Current Facility-Administered Medications:    polyethylene glycol  17 g Oral Daily     simvastatin (ZOCOR) tablet 40 mg  40 mg Oral At Bedtime     OLANZapine  5 mg Oral At Bedtime     aspirin chewable tablet 81 mg  81 mg Oral Daily     calcium-vitamin D  1 tablet Oral Daily     cholecalciferol  2,000 Units Oral Daily     cyanocobalamin  1,000 mcg Oral Daily     PRNs:  OLANZapine, acetaminophen      Allergies    No Known Allergies     Medical Review of Systems   /65 mmHg  Pulse 97  Temp(Src) 98.1  F (36.7  C) (Oral)  Resp 15  Ht 1.549 m (5' 1\")  Wt 61.372 kg (135 lb 4.8 oz)  BMI 25.58 kg/m2  SpO2 94%  Body mass index is 25.58 kg/(m^2).  A 10-point review of systems was performed by Dr. Cartagena and is negative, no new findings.      Psychiatric Examination     Appearance Sitting in chair, dressed in casual clothes. Appears stated age.   Attitude Cooperative, Remains Paranoid, " [de-identified] : Patient had Inspire device implanted - daytime fatigue has improved - patient is on allergy IT every 4 weeks and he reports some snoring improvement.    He takes Flonase daily basis - no other allergy medications.   Some nasal itching.    intermittently tearful   Orientation Oriented to person, place, time   Eye Contact Fair   Speech Regular rate, rhythm, volume and tone   Language Normal   Psychomotor Behavior Normal   Mood Remains anxious, depressed   Affect Heightened, dramatic   Thought Process Intact, Linear   Associations Intact   Thought Content Patient is currently negative for suicide ideation, negative for plan or intent, able to contract no self harm and identify barriers to suicide.  Negative for obsessions, compulsions or psychosis.      Fund of Knowledge Average   Insight Stagnant   Judgement Stagnant   Attention Span & Concentration Impaired   Recent & Remote Memory Poor   Gait Normal          Labs   Labs reviewed.  No results found for this or any previous visit (from the past 24 hour(s)).       Impression     The patient is an 85-year-old woman presenting hyperverbal, anxious, sleep deprived. She has been able to obtain sleep with Zyprexa and has been improving. She will likely follow up with Dr. Ferrer on discharge.     Diagnoses   1. Dementia with behavioral disturbance.  2.  Rule out bipolar disorder, type 2, most recent episode hypomanic.      Plan     1. Explained side effects, benefits, and complications of medications to the patient, Pt gave verbal consent.  2. Medication changes: Continue Zyprexa 5mg qhs.  3. Discussed treatment plan with patient and team.  4. Projected length of stay: 2-3 days, until pt has been stabilized.  5. Pt's son to look into placement at [facility] and possible guardianship.    Attestation:   Patient has been seen and evaluated by me, Stefan Cartagena MD.    Patient ID:  Name: Vanessa Watson   MRN: [id number]  Admission: 1/6/2017   YOB: 1931